# Patient Record
Sex: MALE | Race: WHITE | NOT HISPANIC OR LATINO | Employment: FULL TIME | URBAN - METROPOLITAN AREA
[De-identification: names, ages, dates, MRNs, and addresses within clinical notes are randomized per-mention and may not be internally consistent; named-entity substitution may affect disease eponyms.]

---

## 2017-06-20 ENCOUNTER — TRANSFERRED RECORDS (OUTPATIENT)
Dept: HEALTH INFORMATION MANAGEMENT | Facility: CLINIC | Age: 32
End: 2017-06-20

## 2017-09-12 ENCOUNTER — TRANSFERRED RECORDS (OUTPATIENT)
Dept: HEALTH INFORMATION MANAGEMENT | Facility: CLINIC | Age: 32
End: 2017-09-12

## 2018-09-20 NOTE — PROGRESS NOTES
SUBJECTIVE:   Germán Moreira is a 33 year old male who presents to clinic today for the following health issues:    Chief Complaint   Patient presents with     Headache       Headache  Onset: x 2 weeks    Description:   Location: Global   Character: throbbing pain, dull pain  Frequency:  intermitting  Duration:  often    Intensity: mild, moderate    Progression of Symptoms:  improving    Accompanying Signs & Symptoms:  Stiff neck: no  Neck or upper back pain: no  Fever: no  Sinus pressure: no  Nausea or vomiting: no  Dizziness: no  Numbness: no  Weakness: no  Visual changes: no  Fatigue: Yes    History:   Head trauma: no  Family history of migraines: no  Previous tests for headaches: no  Neurologist evaluations: no  Able to do daily activities: Yes  Wake with a headaches: no  Do headaches wake you up: no  Daily pain medication use: YES- Advil  Work/school stressors/changes: no    Precipitating factors:   Does light make it worse: no  Does sound make it worse: no    Alleviating factors:  Does sleep help: YES- Feels the best in the am    Therapies Tried and outcome: Ibuprofen (Advil, Motrin)- helps    Pt's past medical history, family history, habits, medications and allergies were reviewed with the patient today.  See snap shot for  HCM status. Most recent lab results reviewed with pt. Problem list and histories reviewed & adjusted, as indicated.  Additional history as below:    No headache starting 2 weeks ago as above.  Symptoms all over the head and not localized to the sinuses.  Patient denies nasal congestion or discharge.  Feels overall more tired with occasional chill.  Mild general achiness.  Appetite is reduced and patient has lost 5 pounds.  No known snoring.  Patient has had a few sweats at night to the point that his T shirt was wet.  When turning his head left or right, patient feels like his tracking is slowed but denies actual vertigo.  No coordination issues with walking.  Denies vision changes.   "Has had some stress but patient states this is no more than usual.  Patient has 2 young children but states that is going well.  Denies any recent travel.  No known tick bites.  Denies ear pain, sore throat, sinus pain, cough, shortness of breath, localized abdominal pain, nausea/vomiting, diarrhea, dysuria.  No rashes on his skin.  Denies erectile dysfunction.  Sex drive is seemed normal.  Patient continues to take generic Propecia to maintain her growth but has been on this for quite a long time.  Not taking other over-the-counter supplements     Additional ROS:   Constitutional, HEENT, Cardiovascular, Pulmonary, GI and , Neuro, MSK and Psych review of systems/symptoms are otherwise negative or unchanged from previous, except as noted above.      OBJECTIVE:  /80  Pulse 96  Temp 98.7  F (37.1  C) (Oral)  Resp 16  Ht 6' 1.25\" (1.861 m)  Wt 190 lb (86.2 kg)  SpO2 98%  BMI 24.9 kg/m2   Estimated body mass index is 24.9 kg/(m^2) as calculated from the following:    Height as of this encounter: 6' 1.25\" (1.861 m).    Weight as of this encounter: 190 lb (86.2 kg).  General appearance -   alert, no distress  Skin - No rashes or lesions.  Head - normocephalic, atraumatic  Eyes - MALISSA, EOMI, fundi exam with nondilated pupils negative.  Ears - External ears normal. Canals clear. TM's normal.  Nose/Sinuses - Nares normal. Septum midline. Mucosa minimally edematous.  No drainage or sinus tenderness.  Oropharynx - No erythema, no adenopathy, no exudates.  Neck - Supple without adenopathy or thyromegaly. No bruits.   No nucchal rigidity with full F/E  Lungs - Clear to auscultation without wheezes/rhonchi.  Heart - Regular rate and rhythm without murmurs, clicks, or gallops.  Nodes - No supraclavicular, axillary, or inguinal adenopathy palpable.  Abdomen - Abdomen soft, non-tender. BS normal. No masses or hepatosplenomegaly palpable. No bruits.  Extremities -No cyanosis, clubbing or edema.    Musculoskeletal - " Spine ROM normal. Muscular strength intact.   Peripheral pulses - radial=4/4, femoral=4/4, posterior tibial=4/4, dorsalis pedis=4/4,  Neuro - Gait normal. Reflexes normal and symmetric. Sensation grossly WNL. Normal FNF and HTT coordination F/B. Neg Hallpike bilaterally  Genital - Normal-appearing male external genitalia. No scrotal masses or inguinal hernia palpable.        Assessment/Plan: (See plan discussion below for further details)  1. Chronic nonintractable headache, unspecified headache type   Labs as above. If negative, will obtain MRI brain  - CRP inflammation  - Lyme Disease Elke with reflex to WB Serum  - Blood culture  - Anti Nuclear Elke IgG by IFA with Reflex    2. Night sweats   No obvious cause based on exam. No adenopathy.  Labs as ordered  - Comprehensive metabolic panel  - Testosterone Free and Total  - CRP inflammation  - Lactate Dehydrogenase  - Blood culture  - Anti Nuclear Elke IgG by IFA with Reflex    3. Loss of weight  Labs as ordered. Denies depression  - Comprehensive metabolic panel  - TSH with free T4 reflex  - CBC with platelets  - Testosterone Free and Total  - CRP inflammation  - Anti Nuclear Elke IgG by IFA with Reflex    4. Hepatitis   After above liver labs came back showing elevated LFTs additional labs ordered as below,   - Hepatitis C antibody; Future  - Hepatitis B Surface Antibody; Future  - Hepatitis B surface antigen; Future  - Iron and iron binding capacity; Future  - Erythrocyte sedimentation rate auto; Future  - Hepatitis A antibody IgM; Future  - Anaplasma phagocytoph antibody IgG IgM; Future  - Ehrlichia chaffeenis Abys IgG and IgM; Future      PLAN:  Labs as ordered above   Pt will hold Propecia and stop any ETOH use (currently rare use) and any OTC meds such as Tylenol    Berry Hancock MD  Internal Medicine Department  [facility]

## 2018-09-21 ENCOUNTER — OFFICE VISIT (OUTPATIENT)
Dept: INTERNAL MEDICINE | Facility: CLINIC | Age: 33
End: 2018-09-21
Payer: COMMERCIAL

## 2018-09-21 VITALS
TEMPERATURE: 98.7 F | SYSTOLIC BLOOD PRESSURE: 130 MMHG | WEIGHT: 190 LBS | HEIGHT: 73 IN | RESPIRATION RATE: 16 BRPM | HEART RATE: 96 BPM | OXYGEN SATURATION: 98 % | DIASTOLIC BLOOD PRESSURE: 80 MMHG | BODY MASS INDEX: 25.18 KG/M2

## 2018-09-21 DIAGNOSIS — K75.9 HEPATITIS: ICD-10-CM

## 2018-09-21 DIAGNOSIS — R51.9 CHRONIC NONINTRACTABLE HEADACHE, UNSPECIFIED HEADACHE TYPE: Primary | ICD-10-CM

## 2018-09-21 DIAGNOSIS — R63.4 LOSS OF WEIGHT: ICD-10-CM

## 2018-09-21 DIAGNOSIS — G89.29 CHRONIC NONINTRACTABLE HEADACHE, UNSPECIFIED HEADACHE TYPE: Primary | ICD-10-CM

## 2018-09-21 DIAGNOSIS — R61 NIGHT SWEATS: ICD-10-CM

## 2018-09-21 LAB
ALBUMIN SERPL-MCNC: 4.1 G/DL (ref 3.4–5)
ALP SERPL-CCNC: 151 U/L (ref 40–150)
ALT SERPL W P-5'-P-CCNC: 261 U/L (ref 0–70)
ANION GAP SERPL CALCULATED.3IONS-SCNC: 8 MMOL/L (ref 3–14)
AST SERPL W P-5'-P-CCNC: 166 U/L (ref 0–45)
BILIRUB SERPL-MCNC: 0.9 MG/DL (ref 0.2–1.3)
BUN SERPL-MCNC: 17 MG/DL (ref 7–30)
CALCIUM SERPL-MCNC: 8.7 MG/DL (ref 8.5–10.1)
CHLORIDE SERPL-SCNC: 99 MMOL/L (ref 94–109)
CO2 SERPL-SCNC: 28 MMOL/L (ref 20–32)
CREAT SERPL-MCNC: 1.08 MG/DL (ref 0.66–1.25)
CRP SERPL-MCNC: 12 MG/L (ref 0–8)
ERYTHROCYTE [DISTWIDTH] IN BLOOD BY AUTOMATED COUNT: 12.6 % (ref 10–15)
GFR SERPL CREATININE-BSD FRML MDRD: 78 ML/MIN/1.7M2
GLUCOSE SERPL-MCNC: 89 MG/DL (ref 70–99)
HCT VFR BLD AUTO: 42.9 % (ref 40–53)
HGB BLD-MCNC: 14.6 G/DL (ref 13.3–17.7)
LDH SERPL L TO P-CCNC: 601 U/L (ref 85–227)
MCH RBC QN AUTO: 30 PG (ref 26.5–33)
MCHC RBC AUTO-ENTMCNC: 34 G/DL (ref 31.5–36.5)
MCV RBC AUTO: 88 FL (ref 78–100)
PLATELET # BLD AUTO: 184 10E9/L (ref 150–450)
POTASSIUM SERPL-SCNC: 3.7 MMOL/L (ref 3.4–5.3)
PROT SERPL-MCNC: 8.4 G/DL (ref 6.8–8.8)
RBC # BLD AUTO: 4.86 10E12/L (ref 4.4–5.9)
SODIUM SERPL-SCNC: 135 MMOL/L (ref 133–144)
TSH SERPL DL<=0.005 MIU/L-ACNC: 1.66 MU/L (ref 0.4–4)
WBC # BLD AUTO: 12.4 10E9/L (ref 4–11)

## 2018-09-21 PROCEDURE — 84443 ASSAY THYROID STIM HORMONE: CPT | Performed by: INTERNAL MEDICINE

## 2018-09-21 PROCEDURE — 86038 ANTINUCLEAR ANTIBODIES: CPT | Performed by: INTERNAL MEDICINE

## 2018-09-21 PROCEDURE — 85004 AUTOMATED DIFF WBC COUNT: CPT | Performed by: INTERNAL MEDICINE

## 2018-09-21 PROCEDURE — 84403 ASSAY OF TOTAL TESTOSTERONE: CPT | Performed by: INTERNAL MEDICINE

## 2018-09-21 PROCEDURE — 80053 COMPREHEN METABOLIC PANEL: CPT | Performed by: INTERNAL MEDICINE

## 2018-09-21 PROCEDURE — 86618 LYME DISEASE ANTIBODY: CPT | Performed by: INTERNAL MEDICINE

## 2018-09-21 PROCEDURE — 87040 BLOOD CULTURE FOR BACTERIA: CPT | Performed by: INTERNAL MEDICINE

## 2018-09-21 PROCEDURE — 83615 LACTATE (LD) (LDH) ENZYME: CPT | Performed by: INTERNAL MEDICINE

## 2018-09-21 PROCEDURE — 85027 COMPLETE CBC AUTOMATED: CPT | Performed by: INTERNAL MEDICINE

## 2018-09-21 PROCEDURE — 36415 COLL VENOUS BLD VENIPUNCTURE: CPT | Performed by: INTERNAL MEDICINE

## 2018-09-21 PROCEDURE — 99214 OFFICE O/P EST MOD 30 MIN: CPT | Performed by: INTERNAL MEDICINE

## 2018-09-21 PROCEDURE — 84270 ASSAY OF SEX HORMONE GLOBUL: CPT | Performed by: INTERNAL MEDICINE

## 2018-09-21 PROCEDURE — 86140 C-REACTIVE PROTEIN: CPT | Performed by: INTERNAL MEDICINE

## 2018-09-21 RX ORDER — TRIAMCINOLONE ACETONIDE 1 MG/G
OINTMENT TOPICAL
COMMUNITY
Start: 2018-08-02 | End: 2022-08-12

## 2018-09-21 NOTE — MR AVS SNAPSHOT
"              After Visit Summary   9/21/2018    Germán Moreira    MRN: [id number]           Patient Information     Date Of Birth          1985        Visit Information        Provider Department      9/21/2018 3:30 PM Berry Hancock MD [facility]        Today's Diagnoses     Chronic nonintractable headache, unspecified headache type    -  1    Night sweats        Loss of weight        Hepatitis           Follow-ups after your visit        Who to contact     If you have questions or need follow up information about today's clinic visit or your schedule please contact [facility] directly at [phone].  Normal or non-critical lab and imaging results will be communicated to you by [employer], letter or phone within 4 business days after the clinic has received the results. If you do not hear from us within 7 days, please contact the clinic through [employer] or phone. If you have a critical or abnormal lab result, we will notify you by phone as soon as possible.  Submit refill requests through [employer] or call your pharmacy and they will forward the refill request to us. Please allow 3 business days for your refill to be completed.          Additional Information About Your Visit        MyChart Information     [employer] gives you secure access to your electronic health record. If you see a primary care provider, you can also send messages to your care team and make appointments. If you have questions, please call your primary care clinic.  If you do not have a primary care provider, please call [phone] and they will assist you.        Care EveryWhere ID     This is your Care EveryWhere ID. This could be used by other organizations to access your [place] medical records  [id number]        Your Vitals Were     Pulse Temperature Respirations Height Pulse Oximetry BMI (Body Mass Index)    96 98.7  F (37.1  C) (Oral) 16 6' 1.25\" (1.861 m) 98% 24.9 kg/m2       Blood " Pressure from Last 3 Encounters:   09/21/18 130/80   04/12/16 110/72   04/09/15 102/68    Weight from Last 3 Encounters:   09/21/18 190 lb (86.2 kg)   04/12/16 186 lb (84.4 kg)   04/09/15 168 lb 11.2 oz (76.5 kg)              We Performed the Following     Anti Nuclear Elke IgG by IFA with Reflex     Blood culture     CBC with platelets     Comprehensive metabolic panel     CRP inflammation     Lactate Dehydrogenase     Lyme Disease Elke with reflex to WB Serum     Testosterone Free and Total     TSH with free T4 reflex          Today's Medication Changes          These changes are accurate as of 9/21/18 11:59 PM.  If you have any questions, ask your nurse or doctor.               Stop taking these medicines if you haven't already. Please contact your care team if you have questions.     EPIPEN KRIS 1:1000  IJ   Stopped by:  Berry Hancock MD                    Primary Care Provider Office Phone # Fax #    Berry Hancock [phone]       [address] [place] [address]        Equal Access to Services     SAURAV [facility] Hospital[facility]LL : Hadii aad ku hadasho Soomaali, waaxda luqadaha, qaybta kaalmada adeegyada, waxkeith snow hayernie tay . So [facility] [phone].    ATENCIÓN: Si habla español, tiene a marcano disposición servicios gratuitos de asistencia lingüística. Llame al [phone].    We comply with applicable federal civil rights laws and [place] laws. We do not discriminate on the basis of race, color, national origin, age, disability, sex, sexual orientation, or gender identity.            Thank you!     Thank you for choosing [facility]  for your care. Our goal is always to provide you with excellent care. Hearing back from our patients is one way we can continue to improve our services. Please take a few minutes to complete the written survey that you may receive in the mail after your visit with us. Thank you!             Your Updated Medication List - Protect others  around you: Learn how to safely use, store and throw away your medicines at www.disposemymeds.org.          This list is accurate as of 9/21/18 11:59 PM.  Always use your most recent med list.                   Brand Name Dispense Instructions for use Diagnosis    EPINEPHrine 0.3 MG/0.3ML injection 2-pack    EPIPEN/ADRENACLICK/or ANY BX GENERIC EQUIV    1 each    Inject 0.3 mLs (0.3 mg) into the muscle once as needed for anaphylaxis    Allergy to seafood       PROPECIA 1 MG tablet   Generic drug:  finasteride          1 TABLET DAILY        triamcinolone 0.1 % ointment    KENALOG

## 2018-09-22 ENCOUNTER — MYC MEDICAL ADVICE (OUTPATIENT)
Dept: INTERNAL MEDICINE | Facility: CLINIC | Age: 33
End: 2018-09-22

## 2018-09-22 DIAGNOSIS — K75.9 HEPATITIS: Primary | ICD-10-CM

## 2018-09-22 DIAGNOSIS — K75.9 HEPATITIS: ICD-10-CM

## 2018-09-22 LAB
ERYTHROCYTE [SEDIMENTATION RATE] IN BLOOD BY WESTERGREN METHOD: 9 MM/H (ref 0–15)
IRON SATN MFR SERPL: 16 % (ref 15–46)
IRON SERPL-MCNC: 41 UG/DL (ref 35–180)
TIBC SERPL-MCNC: 255 UG/DL (ref 240–430)

## 2018-09-22 PROCEDURE — 86666 EHRLICHIA ANTIBODY: CPT | Mod: 90 | Performed by: INTERNAL MEDICINE

## 2018-09-22 PROCEDURE — 86706 HEP B SURFACE ANTIBODY: CPT | Performed by: INTERNAL MEDICINE

## 2018-09-22 PROCEDURE — 86803 HEPATITIS C AB TEST: CPT | Performed by: INTERNAL MEDICINE

## 2018-09-22 PROCEDURE — 99000 SPECIMEN HANDLING OFFICE-LAB: CPT | Performed by: INTERNAL MEDICINE

## 2018-09-22 PROCEDURE — 86709 HEPATITIS A IGM ANTIBODY: CPT | Performed by: INTERNAL MEDICINE

## 2018-09-22 PROCEDURE — 83550 IRON BINDING TEST: CPT | Performed by: INTERNAL MEDICINE

## 2018-09-22 PROCEDURE — 87340 HEPATITIS B SURFACE AG IA: CPT | Performed by: INTERNAL MEDICINE

## 2018-09-22 PROCEDURE — 83540 ASSAY OF IRON: CPT | Performed by: INTERNAL MEDICINE

## 2018-09-22 PROCEDURE — 36415 COLL VENOUS BLD VENIPUNCTURE: CPT | Performed by: INTERNAL MEDICINE

## 2018-09-22 PROCEDURE — 85652 RBC SED RATE AUTOMATED: CPT | Performed by: INTERNAL MEDICINE

## 2018-09-24 ENCOUNTER — TELEPHONE (OUTPATIENT)
Dept: INTERNAL MEDICINE | Facility: CLINIC | Age: 33
End: 2018-09-24

## 2018-09-24 LAB
A PHAGOCYTOPH IGG TITR SER IF: NORMAL {TITER}
A PHAGOCYTOPH IGM TITR SER IF: NORMAL {TITER}
ANA SER QL IF: NEGATIVE
B BURGDOR IGG+IGM SER QL: 0.43 (ref 0–0.89)
E CHAFFEENSIS IGG TITR SER: NORMAL {TITER}
E CHAFFEENSIS IGM TITR SER: NORMAL {TITER}

## 2018-09-24 NOTE — TELEPHONE ENCOUNTER
Reason for call:  Patient reporting a symptom    Symptom or request: Blood in stool/discoloration in stool    Duration (how long have symptoms been present): since Saturday    Have you been treated for this before? No    Additional comments: Per patient, he would like to speak with Dr. Chávez in regards to his symptoms.    Phone Number patient can be reached at:  Home number on file [phone] (home)    Best Time:      Can we leave a detailed message on this number:  YES    Call taken on 9/24/2018 at 8:20 AM by Kaley Ridley

## 2018-09-25 DIAGNOSIS — K75.9 HEPATITIS: ICD-10-CM

## 2018-09-25 LAB
HAV IGM SERPL QL IA: NONREACTIVE
HBV SURFACE AB SERPL IA-ACNC: 506.4 M[IU]/ML
HBV SURFACE AG SERPL QL IA: NONREACTIVE
HCV AB SERPL QL IA: NONREACTIVE
INR PPP: 1.05 (ref 0.86–1.14)
SHBG SERPL-SCNC: 63 NMOL/L (ref 11–80)
TESTOST FREE SERPL-MCNC: 4.36 NG/DL (ref 4.7–24.4)
TESTOST SERPL-MCNC: 342 NG/DL (ref 240–950)

## 2018-09-25 PROCEDURE — 36415 COLL VENOUS BLD VENIPUNCTURE: CPT | Performed by: INTERNAL MEDICINE

## 2018-09-25 PROCEDURE — 80053 COMPREHEN METABOLIC PANEL: CPT | Performed by: INTERNAL MEDICINE

## 2018-09-25 PROCEDURE — 85610 PROTHROMBIN TIME: CPT | Performed by: INTERNAL MEDICINE

## 2018-09-25 NOTE — TELEPHONE ENCOUNTER
Spoke with pt yesterday.  Trace of blood on outside of normal stool. Overall pt feeling a little better. Instructed pt to have repeat LFTs and INR nonfasting at any [facility] lab today or tomorrow. Orders are placed and pt has scheduled lab appt today per chart

## 2018-09-26 ENCOUNTER — TELEPHONE (OUTPATIENT)
Dept: INTERNAL MEDICINE | Facility: CLINIC | Age: 33
End: 2018-09-26

## 2018-09-26 DIAGNOSIS — K75.9 HEPATITIS: Primary | ICD-10-CM

## 2018-09-26 DIAGNOSIS — R63.4 LOSS OF WEIGHT: ICD-10-CM

## 2018-09-26 LAB
ALBUMIN SERPL-MCNC: 3.6 G/DL (ref 3.4–5)
ALP SERPL-CCNC: 125 U/L (ref 40–150)
ALT SERPL W P-5'-P-CCNC: 380 U/L (ref 0–70)
ANION GAP SERPL CALCULATED.3IONS-SCNC: 8 MMOL/L (ref 3–14)
AST SERPL W P-5'-P-CCNC: 248 U/L (ref 0–45)
BILIRUB SERPL-MCNC: 0.6 MG/DL (ref 0.2–1.3)
BUN SERPL-MCNC: 16 MG/DL (ref 7–30)
CALCIUM SERPL-MCNC: 8.3 MG/DL (ref 8.5–10.1)
CHLORIDE SERPL-SCNC: 100 MMOL/L (ref 94–109)
CO2 SERPL-SCNC: 27 MMOL/L (ref 20–32)
CREAT SERPL-MCNC: 1.06 MG/DL (ref 0.66–1.25)
GFR SERPL CREATININE-BSD FRML MDRD: 80 ML/MIN/1.7M2
GLUCOSE SERPL-MCNC: 90 MG/DL (ref 70–99)
POTASSIUM SERPL-SCNC: 4.4 MMOL/L (ref 3.4–5.3)
PROT SERPL-MCNC: 7.5 G/DL (ref 6.8–8.8)
SODIUM SERPL-SCNC: 135 MMOL/L (ref 133–144)

## 2018-09-26 NOTE — TELEPHONE ENCOUNTER
Spoke with pt. LFTs worse. NO obvious cause based on lab results ordered so far.  Pt states  feeling a little better and appetite improved some despite worsened  LFTs.  With worsening LFTs, will get imaging of abd with CT. [facility]  Further management (future lab recheck, GI consultation, etc) will be based on CT results

## 2018-09-27 ENCOUNTER — HOSPITAL ENCOUNTER (OUTPATIENT)
Dept: CT IMAGING | Facility: CLINIC | Age: 33
Discharge: HOME | End: 2018-09-27
Attending: INTERNAL MEDICINE | Admitting: INTERNAL MEDICINE
Payer: COMMERCIAL

## 2018-09-27 DIAGNOSIS — R63.4 LOSS OF WEIGHT: ICD-10-CM

## 2018-09-27 DIAGNOSIS — K75.9 HEPATITIS: ICD-10-CM

## 2018-09-27 LAB
BACTERIA SPEC CULT: NO GROWTH
DIFFERENTIAL METHOD BLD: ABNORMAL
EOSINOPHIL # BLD AUTO: 0.1 10E9/L (ref 0–0.7)
EOSINOPHIL NFR BLD AUTO: 1 %
LYMPHOCYTES # BLD AUTO: 7.1 10E9/L (ref 0.8–5.3)
LYMPHOCYTES NFR BLD AUTO: 57 %
Lab: NORMAL
MONOCYTES # BLD AUTO: 0.7 10E9/L (ref 0–1.3)
MONOCYTES NFR BLD AUTO: 6 %
NEUTROPHILS # BLD AUTO: 4.5 10E9/L (ref 1.6–8.3)
NEUTROPHILS NFR BLD AUTO: 36 %
PLATELET # BLD EST: ABNORMAL 10*3/UL
RBC MORPH BLD: NORMAL
SPECIMEN SOURCE: NORMAL

## 2018-09-27 PROCEDURE — 25000128 H RX IP 250 OP 636: Performed by: INTERNAL MEDICINE

## 2018-09-27 PROCEDURE — 25000125 ZZHC RX 250: Performed by: INTERNAL MEDICINE

## 2018-09-27 PROCEDURE — 74177 CT ABD & PELVIS W/CONTRAST: CPT

## 2018-09-27 RX ORDER — IOPAMIDOL 755 MG/ML
93 INJECTION, SOLUTION INTRAVASCULAR ONCE
Status: COMPLETED | OUTPATIENT
Start: 2018-09-27 | End: 2018-09-27

## 2018-09-27 RX ADMIN — IOPAMIDOL 93 ML: 755 INJECTION, SOLUTION INTRAVENOUS at 08:01

## 2018-09-27 RX ADMIN — SODIUM CHLORIDE, PRESERVATIVE FREE 67 ML: 5 INJECTION INTRAVENOUS at 08:00

## 2018-09-28 ENCOUNTER — EXTERNAL ORDER RESULTS (OUTPATIENT)
Dept: INTERNAL MEDICINE | Facility: CLINIC | Age: 33
End: 2018-09-28

## 2018-09-28 DIAGNOSIS — K75.9 HEPATITIS: Primary | ICD-10-CM

## 2018-10-01 ENCOUNTER — MYC MEDICAL ADVICE (OUTPATIENT)
Dept: INTERNAL MEDICINE | Facility: CLINIC | Age: 33
End: 2018-10-01

## 2018-10-01 DIAGNOSIS — K75.9 HEPATITIS: ICD-10-CM

## 2018-10-01 LAB
ALBUMIN SERPL-MCNC: 3.6 G/DL (ref 3.4–5)
ALP SERPL-CCNC: 99 U/L (ref 40–150)
ALT SERPL W P-5'-P-CCNC: 312 U/L (ref 0–70)
AST SERPL W P-5'-P-CCNC: 136 U/L (ref 0–45)
BILIRUB DIRECT SERPL-MCNC: 0.2 MG/DL (ref 0–0.2)
BILIRUB SERPL-MCNC: 0.7 MG/DL (ref 0.2–1.3)
PROT SERPL-MCNC: 7.7 G/DL (ref 6.8–8.8)

## 2018-10-01 PROCEDURE — 80076 HEPATIC FUNCTION PANEL: CPT | Performed by: INTERNAL MEDICINE

## 2018-10-01 PROCEDURE — 36415 COLL VENOUS BLD VENIPUNCTURE: CPT | Performed by: INTERNAL MEDICINE

## 2018-10-02 DIAGNOSIS — K75.9 HEPATITIS: Primary | ICD-10-CM

## 2018-10-02 NOTE — TELEPHONE ENCOUNTER
Spoke with pt. LFTs improving and pt feeling OK except for  Mild URI sx  As below that are also better this afternoon and will treated with warm water, saline nasal rinse, etc. Will continue off of meds and any ETOH ands repeat lab 2 weeks

## 2018-10-16 DIAGNOSIS — K75.9 HEPATITIS: ICD-10-CM

## 2018-10-16 LAB
ALBUMIN SERPL-MCNC: 3.6 G/DL (ref 3.4–5)
ALP SERPL-CCNC: 69 U/L (ref 40–150)
ALT SERPL W P-5'-P-CCNC: 60 U/L (ref 0–70)
AST SERPL W P-5'-P-CCNC: 26 U/L (ref 0–45)
BILIRUB DIRECT SERPL-MCNC: 0.2 MG/DL (ref 0–0.2)
BILIRUB SERPL-MCNC: 0.6 MG/DL (ref 0.2–1.3)
PROT SERPL-MCNC: 7.4 G/DL (ref 6.8–8.8)

## 2018-10-16 PROCEDURE — 36415 COLL VENOUS BLD VENIPUNCTURE: CPT | Performed by: INTERNAL MEDICINE

## 2018-10-16 PROCEDURE — 80076 HEPATIC FUNCTION PANEL: CPT | Performed by: INTERNAL MEDICINE

## 2018-10-17 DIAGNOSIS — K75.9 HEPATITIS: Primary | ICD-10-CM

## 2018-10-31 ENCOUNTER — MYC MEDICAL ADVICE (OUTPATIENT)
Dept: INTERNAL MEDICINE | Facility: CLINIC | Age: 33
End: 2018-10-31

## 2018-11-12 ENCOUNTER — TELEPHONE (OUTPATIENT)
Dept: FAMILY MEDICINE | Facility: CLINIC | Age: 33
End: 2018-11-12

## 2018-11-12 ENCOUNTER — OFFICE VISIT (OUTPATIENT)
Dept: INTERNAL MEDICINE | Facility: CLINIC | Age: 33
End: 2018-11-12
Payer: COMMERCIAL

## 2018-11-12 VITALS
BODY MASS INDEX: 23.85 KG/M2 | OXYGEN SATURATION: 99 % | DIASTOLIC BLOOD PRESSURE: 78 MMHG | TEMPERATURE: 98.3 F | WEIGHT: 182 LBS | RESPIRATION RATE: 16 BRPM | SYSTOLIC BLOOD PRESSURE: 130 MMHG | HEART RATE: 82 BPM

## 2018-11-12 DIAGNOSIS — R07.9 ACUTE CHEST PAIN: Primary | ICD-10-CM

## 2018-11-12 LAB
ALBUMIN SERPL-MCNC: 4.2 G/DL (ref 3.4–5)
ALP SERPL-CCNC: 65 U/L (ref 40–150)
ALT SERPL W P-5'-P-CCNC: 46 U/L (ref 0–70)
ANION GAP SERPL CALCULATED.3IONS-SCNC: 7 MMOL/L (ref 3–14)
AST SERPL W P-5'-P-CCNC: 24 U/L (ref 0–45)
BILIRUB SERPL-MCNC: 0.9 MG/DL (ref 0.2–1.3)
BUN SERPL-MCNC: 14 MG/DL (ref 7–30)
CALCIUM SERPL-MCNC: 8.8 MG/DL (ref 8.5–10.1)
CHLORIDE SERPL-SCNC: 104 MMOL/L (ref 94–109)
CO2 SERPL-SCNC: 28 MMOL/L (ref 20–32)
CREAT SERPL-MCNC: 0.9 MG/DL (ref 0.66–1.25)
D DIMER PPP FEU-MCNC: 0.2 UG/ML FEU (ref 0–0.5)
ERYTHROCYTE [SEDIMENTATION RATE] IN BLOOD BY WESTERGREN METHOD: 6 MM/H (ref 0–15)
GFR SERPL CREATININE-BSD FRML MDRD: >90 ML/MIN/1.7M2
GLUCOSE SERPL-MCNC: 86 MG/DL (ref 70–99)
POTASSIUM SERPL-SCNC: 3.7 MMOL/L (ref 3.4–5.3)
PROT SERPL-MCNC: 8 G/DL (ref 6.8–8.8)
SODIUM SERPL-SCNC: 139 MMOL/L (ref 133–144)
TROPONIN I SERPL-MCNC: <0.015 UG/L (ref 0–0.04)

## 2018-11-12 PROCEDURE — 85652 RBC SED RATE AUTOMATED: CPT | Performed by: INTERNAL MEDICINE

## 2018-11-12 PROCEDURE — 84484 ASSAY OF TROPONIN QUANT: CPT | Performed by: INTERNAL MEDICINE

## 2018-11-12 PROCEDURE — 85379 FIBRIN DEGRADATION QUANT: CPT | Performed by: INTERNAL MEDICINE

## 2018-11-12 PROCEDURE — 93000 ELECTROCARDIOGRAM COMPLETE: CPT | Performed by: INTERNAL MEDICINE

## 2018-11-12 PROCEDURE — 80053 COMPREHEN METABOLIC PANEL: CPT | Performed by: INTERNAL MEDICINE

## 2018-11-12 PROCEDURE — 99214 OFFICE O/P EST MOD 30 MIN: CPT | Performed by: INTERNAL MEDICINE

## 2018-11-12 PROCEDURE — 36415 COLL VENOUS BLD VENIPUNCTURE: CPT | Performed by: INTERNAL MEDICINE

## 2018-11-12 ASSESSMENT — PATIENT HEALTH QUESTIONNAIRE - PHQ9
5. POOR APPETITE OR OVEREATING: NOT AT ALL
SUM OF ALL RESPONSES TO PHQ QUESTIONS 1-9: 0

## 2018-11-12 ASSESSMENT — ANXIETY QUESTIONNAIRES
2. NOT BEING ABLE TO STOP OR CONTROL WORRYING: NOT AT ALL
5. BEING SO RESTLESS THAT IT IS HARD TO SIT STILL: NOT AT ALL
IF YOU CHECKED OFF ANY PROBLEMS ON THIS QUESTIONNAIRE, HOW DIFFICULT HAVE THESE PROBLEMS MADE IT FOR YOU TO DO YOUR WORK, TAKE CARE OF THINGS AT HOME, OR GET ALONG WITH OTHER PEOPLE: NOT DIFFICULT AT ALL
3. WORRYING TOO MUCH ABOUT DIFFERENT THINGS: NOT AT ALL
7. FEELING AFRAID AS IF SOMETHING AWFUL MIGHT HAPPEN: NOT AT ALL
6. BECOMING EASILY ANNOYED OR IRRITABLE: NOT AT ALL
1. FEELING NERVOUS, ANXIOUS, OR ON EDGE: NOT AT ALL
GAD7 TOTAL SCORE: 0

## 2018-11-12 NOTE — PROGRESS NOTES
"  SUBJECTIVE:   Germán Moreira is a 33 year old male who presents to clinic today for the following health issues:    Chief Complaint   Patient presents with     Chest Pain       CHEST PAIN     Onset: 1 week    Description:   Location:  left side  Character: Dull achey  Radiation: left arm and right arm occ  Duration: intermittent     Intensity: mild    Progression of Symptoms:  same    Accompanying Signs & Symptoms:  Shortness of breath: no  Sweating: no  Nausea/vomiting: no  Lightheadedness: YES- \"cloudy\"  Palpitations: no  Fever/Chills: no  Cough: no  Heartburn: no    History:   Family history of heart disease no  Tobacco use: no    Precipitating factors:   Worse with exertion: no  Worse with deep breaths :  YES- at times  Related to food: no    Alleviating factors:  none       Therapies Tried and outcome: none        Pt's past medical history, family history, habits, medications and allergies were reviewed with the patient today.  See snap shot for  HCM status. Most recent lab results reviewed with pt. Problem list and histories reviewed & adjusted, as indicated.  Additional history as below:    Patient notes some recent upper respiratory infectious symptoms including mild sore throat, voice hoarseness and nonproductive cough.  He is overall getting better.  Denies sinus drainage.  No shortness of breath.  No fevers or chills.  No recent travel.  Denies calf pain.  Chest pains sometimes on the left side and sometimes on the right and right also has noticed some achiness intermittently in the arms..  Denies neck pain.  No weakness in the extremities.  Able to reproduce discomfort in chest slightly with palpation near the sternum and pectoral musculature bilaterally     Additional ROS:   Constitutional, HEENT, Cardiovascular, Pulmonary, GI and , Neuro, MSK and Psych review of systems/symptoms are otherwise negative or unchanged from previous, except as noted above.      OBJECTIVE:  /78  Pulse 82  Temp " "98.3  F (36.8  C) (Oral)  Resp 16  Wt 182 lb (82.6 kg)  SpO2 99%  BMI 23.85 kg/m2   Estimated body mass index is 23.85 kg/(m^2) as calculated from the following:    Height as of 9/21/18: 6' 1.25\" (1.861 m).    Weight as of this encounter: 182 lb (82.6 kg).    HENT: ear canals and TM's normal and nose and mouth without ulcers or lesions.  Nasal mucosa mildly edematous.  Sinuses nontender to palpation  Neck: no adenopathy. Thyroid normal to palpation. No bruits  Pulm: Lungs clear to auscultation   CV: Regular rates and rhythm  GI: Soft, nontender, Normal active bowel sounds, No hepatosplenomegaly or masses palpable  Ext: Peripheral pulses intact. No edema.  Neuro: Normal strength and tone, sensory exam grossly normal  MSK: No tenderness to neck range of motion.  Mild tenderness palpation. peristernal cartilage and bilateral pectoral musculature    EKG: No acute ST/T changes. Rate 90    Assessment/Plan: (See plan discussion below for further details)  1. Acute chest pain  Atypical and appears likely related to recent URI and some costochondritis. Will check labs in addition to r/o more serious causes. If neg, may use Occ IBF with food as needed  - Erythrocyte sedimentation rate auto  - Comprehensive metabolic panel  - D dimer, quantitative  - Troponin I  - EKG           Berry Hancock MD  Internal Medicine Department  [facility]        "

## 2018-11-12 NOTE — TELEPHONE ENCOUNTER
Germán Moreira is a 33 year old male who calls with chest pain.     PRESENTING PROBLEM:  Chest pain sometimes in center, sometimes in L arm, sometimes in R arm (not radiating), some in head times.  3-4 days of cold symptoms.    NURSING ASSESSMENT:  Patient complains of chest pain and chest pressure/discomfort.  Onset:  3-4 days ago, pain intermittent and changes locations.  Pain is characterized as discomfort more than pain.   Severity intermittent  Located left arm, left chest, right arm, right chest and right shoulder   Radiates to none.  Duration intermittent.  Associated symptoms cough.  Exacerbated by no known provoking events.  Relieved by none.  No pain while sleeping.  Cardiac risk factors: denies all..  Associated Medical History: wondering if the inflamed liver is causing this.  Allergies:   Allergies   Allergen Reactions     Seafood      anaphylaxis     Patient states the pain shows up in different areas, and feels like muscle weakness.  He describes the pain as discomfort.  It is worse with a deep breath.  Denies, naurse vomiting, trouble breathing, or discoloration in skin.  Denies history personal or family or heart issues, no diab, no travel or prolonged sitting.  No coughing up blood.  No drug use.   History of sports injury in right shoulder.        Last exam/Treatment:  9/21/18    NURSING PLAN: Routed to provider FYI-  Appt scheduled for 4.    RECOMMENDED DISPOSITION:  See in 24 hours - Patient has an appt today.  Will call 911/go to ED if trouble breathing, pain radiating, palpitations.  Will comply with recommendation: Yes  If further questions/concerns or if symptoms do not improve, worsen or new symptoms develop, call your PCP or [facility] as soon as possible.      Guideline used:  Telephone Triage Protocols for Nurses, Fourth Edition, Anna Cameron RN

## 2018-11-13 ASSESSMENT — ANXIETY QUESTIONNAIRES: GAD7 TOTAL SCORE: 0

## 2019-01-18 ENCOUNTER — OFFICE VISIT (OUTPATIENT)
Dept: INTERNAL MEDICINE | Facility: CLINIC | Age: 34
End: 2019-01-18
Payer: COMMERCIAL

## 2019-01-18 VITALS
OXYGEN SATURATION: 99 % | HEART RATE: 66 BPM | BODY MASS INDEX: 24.39 KG/M2 | WEIGHT: 184 LBS | SYSTOLIC BLOOD PRESSURE: 128 MMHG | HEIGHT: 73 IN | TEMPERATURE: 97.5 F | RESPIRATION RATE: 16 BRPM | DIASTOLIC BLOOD PRESSURE: 78 MMHG

## 2019-01-18 DIAGNOSIS — Z00.00 ENCOUNTER FOR ROUTINE ADULT HEALTH EXAMINATION WITHOUT ABNORMAL FINDINGS: Primary | ICD-10-CM

## 2019-01-18 LAB
ALBUMIN SERPL-MCNC: 4.4 G/DL (ref 3.4–5)
ALP SERPL-CCNC: 63 U/L (ref 40–150)
ALT SERPL W P-5'-P-CCNC: 32 U/L (ref 0–70)
ANION GAP SERPL CALCULATED.3IONS-SCNC: 6 MMOL/L (ref 3–14)
AST SERPL W P-5'-P-CCNC: 11 U/L (ref 0–45)
BILIRUB SERPL-MCNC: 1.3 MG/DL (ref 0.2–1.3)
BUN SERPL-MCNC: 15 MG/DL (ref 7–30)
CALCIUM SERPL-MCNC: 9 MG/DL (ref 8.5–10.1)
CHLORIDE SERPL-SCNC: 102 MMOL/L (ref 94–109)
CHOLEST SERPL-MCNC: 158 MG/DL
CO2 SERPL-SCNC: 29 MMOL/L (ref 20–32)
CREAT SERPL-MCNC: 0.9 MG/DL (ref 0.66–1.25)
ERYTHROCYTE [DISTWIDTH] IN BLOOD BY AUTOMATED COUNT: 13.5 % (ref 10–15)
GFR SERPL CREATININE-BSD FRML MDRD: >90 ML/MIN/{1.73_M2}
GLUCOSE SERPL-MCNC: 93 MG/DL (ref 70–99)
HCT VFR BLD AUTO: 44.1 % (ref 40–53)
HDLC SERPL-MCNC: 57 MG/DL
HGB BLD-MCNC: 15.5 G/DL (ref 13.3–17.7)
LDLC SERPL CALC-MCNC: 91 MG/DL
MCH RBC QN AUTO: 30.2 PG (ref 26.5–33)
MCHC RBC AUTO-ENTMCNC: 35.1 G/DL (ref 31.5–36.5)
MCV RBC AUTO: 86 FL (ref 78–100)
NONHDLC SERPL-MCNC: 101 MG/DL
PLATELET # BLD AUTO: 202 10E9/L (ref 150–450)
POTASSIUM SERPL-SCNC: 3.6 MMOL/L (ref 3.4–5.3)
PROT SERPL-MCNC: 8 G/DL (ref 6.8–8.8)
RBC # BLD AUTO: 5.14 10E12/L (ref 4.4–5.9)
SODIUM SERPL-SCNC: 137 MMOL/L (ref 133–144)
TRIGL SERPL-MCNC: 52 MG/DL
VIT B12 SERPL-MCNC: 433 PG/ML (ref 193–986)
WBC # BLD AUTO: 7.9 10E9/L (ref 4–11)

## 2019-01-18 PROCEDURE — 80061 LIPID PANEL: CPT | Performed by: INTERNAL MEDICINE

## 2019-01-18 PROCEDURE — 82607 VITAMIN B-12: CPT | Performed by: INTERNAL MEDICINE

## 2019-01-18 PROCEDURE — 99395 PREV VISIT EST AGE 18-39: CPT | Performed by: INTERNAL MEDICINE

## 2019-01-18 PROCEDURE — 80053 COMPREHEN METABOLIC PANEL: CPT | Performed by: INTERNAL MEDICINE

## 2019-01-18 PROCEDURE — 36415 COLL VENOUS BLD VENIPUNCTURE: CPT | Performed by: INTERNAL MEDICINE

## 2019-01-18 PROCEDURE — 85027 COMPLETE CBC AUTOMATED: CPT | Performed by: INTERNAL MEDICINE

## 2019-01-18 ASSESSMENT — MIFFLIN-ST. JEOR: SCORE: 1837.46

## 2019-01-18 NOTE — PROGRESS NOTES
SUBJECTIVE:   CC: Germán Moreira is an 33 year old male who presents for preventive health visit.     Healthy Habits:  Answers for HPI/ROS submitted by the patient on 1/16/2019   Annual Exam:  Frequency of exercise:: None  Getting at least 3 servings of Calcium per day:: Yes  Diet:: Regular (no restrictions)  Taking medications regularly:: Yes  Medication side effects:: Not applicable  Bi-annual eye exam:: NO  Dental care twice a year:: Yes  Sleep apnea or symptoms of sleep apnea:: None  Additional concerns today:: No          Today's PHQ-2 Score:   PHQ-2 ( 1999 [employer]) 1/16/2019 11/12/2018   Q1: Little interest or pleasure in doing things 0 0   Q2: Feeling down, depressed or hopeless 0 0   PHQ-2 Score 0 0   Q1: Little interest or pleasure in doing things Not at all -   Q2: Feeling down, depressed or hopeless Not at all -   PHQ-2 Score 0 -       Abuse: Current or Past(Physical, Sexual or Emotional)- No  Do you feel safe in your environment? Yes    Social History     Tobacco Use     Smoking status: Never Smoker     Smokeless tobacco: Never Used   Substance Use Topics     Alcohol use: Yes     Comment: casu     If you drink alcohol do you typically have >3 drinks per day or >7 drinks per week? No                      Last PSA: No results found for: PSA    Reviewed orders with patient. Reviewed health maintenance and updated orders accordingly - Yes  Labs reviewed in EPIC    Reviewed and updated as needed this visit by clinical staff         Reviewed and updated as needed this visit by Provider            ROS:  CONSTITUTIONAL: NEGATIVE for fever, chills. Weight down a few pounds   INTEGUMENTARY/SKIN: NEGATIVE for worrisome rashes, moles or lesions  EYES: NEGATIVE for vision changes or irritation.  Due for eye exam   ENT: NEGATIVE for ear, mouth and throat problems  RESP: NEGATIVE for significant cough or SOB  CV: NEGATIVE for chest pain, palpitations or peripheral edema  GI: NEGATIVE for nausea, abdominal pain,  "heartburn, or change in bowel habits   male: negative for dysuria, hematuria, decreased urinary stream, erectile dysfunction, urethral discharge  MUSCULOSKELETAL: NEGATIVE for significant arthralgias or myalgia  NEURO: NEGATIVE for weakness, dizziness. Very rare sense of parasthesia in left hand. No sx now  PSYCHIATRIC: NEGATIVE for changes in mood or affect    OBJECTIVE:   /78   Pulse 66   Temp 97.5  F (36.4  C) (Oral)   Resp 16   Ht 1.861 m (6' 1.25\")   Wt 83.5 kg (184 lb)   SpO2 99%   BMI 24.11 kg/m    EXAM:  General appearance - healthy, alert, no distress  Skin - No rashes or lesions. Few small skin tags on trunk  Head - normocephalic, atraumatic  Eyes - MALISSA, EOMI, fundi exam with nondilated pupils negative.  Ears - External ears normal. Canals clear. TM's normal.  Nose/Sinuses - Nares normal. Septum midline. Mucosa normal. No drainage or sinus tenderness.  Oropharynx - No erythema, no adenopathy, no exudates.  Neck - Supple without adenopathy or thyromegaly. No bruits.  Lungs - Clear to auscultation without wheezes/rhonchi.  Heart - Regular rate and rhythm without murmurs, clicks, or gallops.  Nodes - No supraclavicular, axillary, or inguinal adenopathy palpable.  Abdomen - Abdomen soft, non-tender. BS normal. No masses or hepatosplenomegaly palpable. No bruits.  Extremities -No cyanosis, clubbing or edema.    Musculoskeletal - Spine ROM normal. Muscular strength intact.   Peripheral pulses - radial=4/4, femoral=4/4, posterior tibial=4/4, dorsalis pedis=4/4,  Neuro - Gait normal. Reflexes normal and symmetric. Sensation grossly WNL to light touch sensation and vibration testing in distal bilateral upper and lower extremities.  Neg Tinel's bilaterally   Genital - Normal-appearing male external genitalia. No scrotal masses or inguinal hernia palpable.   Rectal - Guaic negative stool. Normal tone. Prostate normal in size to palpation. No rectal masses or prostate nodularity palpable       " "    ASSESSMENT/PLAN:   1. Encounter for routine adult health examination without abnormal findings   Screening labs as ordered. Recommended eye exam. Pt declines flu vaccine  - Comprehensive metabolic panel  - Lipid panel reflex to direct LDL Fasting  - CBC with platelets  - Vitamin B12    COUNSELING:  Reviewed preventive health counseling, as reflected in patient instructions    BP Readings from Last 1 Encounters:   11/12/18 130/78     Estimated body mass index is 23.85 kg/m  as calculated from the following:    Height as of 9/21/18: 1.861 m (6' 1.25\").    Weight as of 11/12/18: 82.6 kg (182 lb).    BP Screening:   Last 3 BP Readings:    BP Readings from Last 3 Encounters:   01/18/19 128/78   11/12/18 130/78   09/21/18 130/80       The following was recommended to the patient:  Re-screen BP within a year and recommended lifestyle modifications       reports that  has never smoked. he has never used smokeless tobacco.      Counseling Resources:  ATP IV Guidelines  Pooled Cohorts Equation Calculator  FRAX Risk Assessment  ICSI Preventive Guidelines  Dietary Guidelines for Americans, 2010  USDA's MyPlate  ASA Prophylaxis  Lung CA Screening    Berry Hancock MD  [facility]  "

## 2019-01-22 ENCOUNTER — TELEPHONE (OUTPATIENT)
Dept: INTERNAL MEDICINE | Facility: CLINIC | Age: 34
End: 2019-01-22

## 2019-01-22 NOTE — LETTER
[facility]  [address] [place] [address]  [phone]      1/22/2019       Germán Moreira  [address] [place] [id number]        Dear Germán,  Enclosed is the original copy of your Biometric Form. A copy has been faxed to [phone] and scanned into your chart. Call the number listed above with any questions.      Sincerely,      Berry Hancock MD/[facility]    Internal Medicine

## 2019-01-22 NOTE — TELEPHONE ENCOUNTER
Biometric Form Faxed: [phone], copy scanned into chart and original mailed to patient.  The patient has been notified.

## 2019-05-14 ENCOUNTER — OFFICE VISIT (OUTPATIENT)
Dept: INTERNAL MEDICINE | Facility: CLINIC | Age: 34
End: 2019-05-14
Payer: COMMERCIAL

## 2019-05-14 VITALS
DIASTOLIC BLOOD PRESSURE: 78 MMHG | RESPIRATION RATE: 16 BRPM | OXYGEN SATURATION: 100 % | TEMPERATURE: 97.8 F | SYSTOLIC BLOOD PRESSURE: 124 MMHG | WEIGHT: 177 LBS | BODY MASS INDEX: 23.19 KG/M2 | HEART RATE: 78 BPM

## 2019-05-14 DIAGNOSIS — M54.2 NECK DISCOMFORT: ICD-10-CM

## 2019-05-14 DIAGNOSIS — F41.9 ANXIETY: Primary | ICD-10-CM

## 2019-05-14 PROCEDURE — 99213 OFFICE O/P EST LOW 20 MIN: CPT | Performed by: INTERNAL MEDICINE

## 2019-05-14 NOTE — PROGRESS NOTES
"  SUBJECTIVE:   Germán Moreira is a 34 year old male who presents to clinic today for the following   health issues:    Chief Complaint   Patient presents with     Gland     Patient c/o lump on right side on throat, x5 weeks     Pt's past medical history, family history, habits, medications and allergies were reviewed with the patient today.  See snap shot for  HCM status. Most recent lab results reviewed with pt. Problem list and histories reviewed & adjusted, as indicated.  Additional history as below:     Had fever and diarrhea 5 weeks ago for 3 days and then resolved  Then had some soreness neck with head turning for 4-5 days and then that resolved without intervention   Since then., minimal fogginess in head   Occ odd achy feeling left shoulder or different areas of the neck  CUrrently no ear pain, sore throat, cough, shortness of breath. No dysphagia  No UE or LE  Neuro sx.   Feels slight prominence in left neck under chin and worried might have cancer  In general, has been more anxious about things since becoming a father. Work and marriage good per pt   FRANCE = 9     Additional ROS:   Constitutional, HEENT, Cardiovascular, Pulmonary, GI and , Neuro, MSK and Psych review of systems/symptoms are otherwise negative or unchanged from previous, except as noted above.      OBJECTIVE:  /78   Pulse 78   Temp 97.8  F (36.6  C) (Oral)   Resp 16   Wt 80.3 kg (177 lb)   SpO2 100%   BMI 23.19 kg/m     Estimated body mass index is 23.19 kg/m  as calculated from the following:    Height as of 1/18/19: 1.861 m (6' 1.25\").    Weight as of this encounter: 80.3 kg (177 lb).     HENT: ear canals and TM's normal and nose and mouth without ulcers or lesions   Neck:  Thyroid normal to palpation. No bruits. Area where pt feels \"swelling\" seems to be edge of hyoid bone vs part of the salivary gland. I do not appreciate any anterior/posterior chain adenopathy on palpation. FROM without tenderness. NO nucchal " "rigidity  Pulm: Lungs clear to auscultation   CV: Regular rates and rhythm  GI: Soft, nontender, Normal active bowel sounds, No hepatosplenomegaly or masses palpable  Ext: Peripheral pulses intact. No edema.  Neuro: Normal strength and tone, sensory exam grossly normal  Node: As above re: neck.  No supraclavicular, axillary, epitrochlear or inguinal adenopathy palpable.  Gen: Slight anxious affect    Assessment/Plan: (See plan discussion below for further details)  1. Neck discomfort  No actual adenopathy appreciated on my exam today.  Patient likely had mild adenopathy with previous infectious issues that have resolved and now increasingly anxious about this.  Counseled patient to watch things over the next couple weeks.  If still feeling this the first week of June, then patient will inform physician and to be extra cautious, will obtain CT scan soft tissue of the neck    2. Anxiety  Patient admits in general to having increasing anxiety issues.  FRANCE = 9.  Will refer for counseling.  Discussed CBT.  If not controlled with these options, patient will inform physician and will then consider SSRI therapy  - MENTAL HEALTH REFERRAL  - Adult; Outpatient Treatment; Individual/Couples/Family/Group Therapy/Health Psychology; [facility] [place]: [facility] [phone]; We will contact you to schedule the appointment or please call with any questions    Plan discussion:  Referral to [facility]. They will khris to schedule or you may call them  If neck issues persist 6/3/19, then email me and will get CT scan of neck  If mood issues worsen in future, then possible Escitalopram/lexapro  \"Mind over Mood\" book option for CBT     >25 minutes was spent with the patient today with more than 50% of the appointment providing counseling/education re: anxiety and treatment options  Along with review of neck anatomy showing pt pictures from anatomy text  and coordination of care      Berry Hancock MD  Internal Medicine " [facility]    (Chart documentation was completed, in part, with [employer] voice-recognition software. Even though reviewed, some grammatical, spelling, and word errors may remain.)

## 2019-05-14 NOTE — PATIENT INSTRUCTIONS
"Referral to [facility]. They will khris to schedule or you may call them  If neck issues persist 6/3/19, then email me and pavell get CT scan of neck  If mood issues worsen in future, then possible Escitalopram/lexapro  \"Mind over Mood\" book option for CBT  "

## 2019-05-16 ASSESSMENT — ANXIETY QUESTIONNAIRES
6. BECOMING EASILY ANNOYED OR IRRITABLE: SEVERAL DAYS
IF YOU CHECKED OFF ANY PROBLEMS ON THIS QUESTIONNAIRE, HOW DIFFICULT HAVE THESE PROBLEMS MADE IT FOR YOU TO DO YOUR WORK, TAKE CARE OF THINGS AT HOME, OR GET ALONG WITH OTHER PEOPLE: SOMEWHAT DIFFICULT
5. BEING SO RESTLESS THAT IT IS HARD TO SIT STILL: NOT AT ALL
3. WORRYING TOO MUCH ABOUT DIFFERENT THINGS: SEVERAL DAYS
7. FEELING AFRAID AS IF SOMETHING AWFUL MIGHT HAPPEN: MORE THAN HALF THE DAYS
GAD7 TOTAL SCORE: 9
2. NOT BEING ABLE TO STOP OR CONTROL WORRYING: SEVERAL DAYS
1. FEELING NERVOUS, ANXIOUS, OR ON EDGE: MORE THAN HALF THE DAYS

## 2019-05-16 ASSESSMENT — PATIENT HEALTH QUESTIONNAIRE - PHQ9: 5. POOR APPETITE OR OVEREATING: MORE THAN HALF THE DAYS

## 2019-05-17 ASSESSMENT — ANXIETY QUESTIONNAIRES: GAD7 TOTAL SCORE: 9

## 2019-05-20 ENCOUNTER — MYC MEDICAL ADVICE (OUTPATIENT)
Dept: INTERNAL MEDICINE | Facility: CLINIC | Age: 34
End: 2019-05-20

## 2019-05-20 DIAGNOSIS — R22.1 LOCALIZED SWELLING, MASS OR LUMP OF NECK: Primary | ICD-10-CM

## 2019-05-20 NOTE — TELEPHONE ENCOUNTER
"PCP please see [employer] message regarding right sided throat/neck soreness update.    JEFFERY, Per 5/14/2019 LOV note:    Return in about 3 weeks (around 6/4/2019) for recheck if acute symptoms are not improving.   Referral to [facility]. They will khris to schedule or you may call them  If neck issues persist 6/3/19, then email me and wilkl get CT scan of neck  If mood issues worsen in future, then possible Escitalopram/lexapro  \"Mind over Mood\" book option for CBT              Please advise if you would like to see patient in for a follow up or ok with ordering a CT.    Radha CARRERO, RN, BSN, PHN      "

## 2019-05-22 ENCOUNTER — HOSPITAL ENCOUNTER (OUTPATIENT)
Dept: CT IMAGING | Facility: CLINIC | Age: 34
Discharge: HOME | End: 2019-05-22
Attending: INTERNAL MEDICINE | Admitting: INTERNAL MEDICINE
Payer: COMMERCIAL

## 2019-05-22 DIAGNOSIS — R22.1 LOCALIZED SWELLING, MASS OR LUMP OF NECK: ICD-10-CM

## 2019-05-22 PROCEDURE — 25000125 ZZHC RX 250: Performed by: INTERNAL MEDICINE

## 2019-05-22 PROCEDURE — 25000128 H RX IP 250 OP 636: Performed by: INTERNAL MEDICINE

## 2019-05-22 PROCEDURE — 70491 CT SOFT TISSUE NECK W/DYE: CPT

## 2019-05-22 RX ORDER — IOPAMIDOL 755 MG/ML
80 INJECTION, SOLUTION INTRAVASCULAR ONCE
Status: COMPLETED | OUTPATIENT
Start: 2019-05-22 | End: 2019-05-22

## 2019-05-22 RX ADMIN — SODIUM CHLORIDE 60 ML: 9 INJECTION, SOLUTION INTRAVENOUS at 16:37

## 2019-05-22 RX ADMIN — IOPAMIDOL 80 ML: 755 INJECTION, SOLUTION INTRAVENOUS at 16:37

## 2019-05-24 ENCOUNTER — TELEPHONE (OUTPATIENT)
Dept: INTERNAL MEDICINE | Facility: CLINIC | Age: 34
End: 2019-05-24

## 2019-05-24 DIAGNOSIS — R59.1 LA (LYMPHADENOPATHY): Primary | ICD-10-CM

## 2019-05-24 NOTE — TELEPHONE ENCOUNTER
Spoke with pt. Reviewed CT scan. He describes slight bad taste/smell in mouth and rare send of some pus near back of throat. No significant nasal drainage or pain. Slight prominence to tonsillar tissues on scan and LNs that appear reactive. Will therefore treat with Augmentin 1 tab BID for 7 days. Rx faced to pharmacy. Pt will have some yogurt daily in addition while on abx to try and counter risk diarrhea with med. He will send me update again in 2 weeks

## 2019-06-03 ENCOUNTER — MYC MEDICAL ADVICE (OUTPATIENT)
Dept: INTERNAL MEDICINE | Facility: CLINIC | Age: 34
End: 2019-06-03

## 2019-06-08 ENCOUNTER — OFFICE VISIT (OUTPATIENT)
Dept: URGENT CARE | Facility: URGENT CARE | Age: 34
End: 2019-06-08
Payer: COMMERCIAL

## 2019-06-08 VITALS
RESPIRATION RATE: 20 BRPM | HEART RATE: 69 BPM | DIASTOLIC BLOOD PRESSURE: 76 MMHG | BODY MASS INDEX: 22.54 KG/M2 | WEIGHT: 172 LBS | SYSTOLIC BLOOD PRESSURE: 110 MMHG | TEMPERATURE: 97.9 F

## 2019-06-08 DIAGNOSIS — L04.9 LYMPHADENITIS, ACUTE: ICD-10-CM

## 2019-06-08 DIAGNOSIS — R22.1 THROAT SWELLING: Primary | ICD-10-CM

## 2019-06-08 DIAGNOSIS — K12.2 UVULITIS: ICD-10-CM

## 2019-06-08 LAB
DEPRECATED S PYO AG THROAT QL EIA: NORMAL
SPECIMEN SOURCE: NORMAL

## 2019-06-08 PROCEDURE — 87880 STREP A ASSAY W/OPTIC: CPT | Performed by: PHYSICIAN ASSISTANT

## 2019-06-08 PROCEDURE — 87081 CULTURE SCREEN ONLY: CPT | Performed by: PHYSICIAN ASSISTANT

## 2019-06-08 PROCEDURE — 99214 OFFICE O/P EST MOD 30 MIN: CPT | Performed by: PHYSICIAN ASSISTANT

## 2019-06-08 RX ORDER — METHYLPREDNISOLONE 4 MG
TABLET, DOSE PACK ORAL
Qty: 21 TABLET | Refills: 0 | Status: SHIPPED | OUTPATIENT
Start: 2019-06-08 | End: 2019-07-26

## 2019-06-08 RX ORDER — AMOXICILLIN 875 MG
875 TABLET ORAL 2 TIMES DAILY
Qty: 20 TABLET | Refills: 0 | Status: SHIPPED | OUTPATIENT
Start: 2019-06-08 | End: 2019-07-26

## 2019-06-09 LAB
BACTERIA SPEC CULT: NORMAL
SPECIMEN SOURCE: NORMAL

## 2019-06-10 ENCOUNTER — OFFICE VISIT (OUTPATIENT)
Dept: INTERNAL MEDICINE | Facility: CLINIC | Age: 34
End: 2019-06-10
Payer: COMMERCIAL

## 2019-06-10 VITALS
RESPIRATION RATE: 16 BRPM | SYSTOLIC BLOOD PRESSURE: 118 MMHG | WEIGHT: 172 LBS | DIASTOLIC BLOOD PRESSURE: 76 MMHG | TEMPERATURE: 98.2 F | OXYGEN SATURATION: 98 % | HEART RATE: 82 BPM | BODY MASS INDEX: 22.54 KG/M2

## 2019-06-10 DIAGNOSIS — R59.1 LA (LYMPHADENOPATHY): ICD-10-CM

## 2019-06-10 DIAGNOSIS — R63.4 WEIGHT LOSS: ICD-10-CM

## 2019-06-10 DIAGNOSIS — J02.9 SORE THROAT: ICD-10-CM

## 2019-06-10 LAB
ALBUMIN SERPL-MCNC: 4.5 G/DL (ref 3.4–5)
ALP SERPL-CCNC: 66 U/L (ref 40–150)
ALT SERPL W P-5'-P-CCNC: 28 U/L (ref 0–70)
ANION GAP SERPL CALCULATED.3IONS-SCNC: 6 MMOL/L (ref 3–14)
AST SERPL W P-5'-P-CCNC: 12 U/L (ref 0–45)
BILIRUB SERPL-MCNC: 1 MG/DL (ref 0.2–1.3)
BUN SERPL-MCNC: 16 MG/DL (ref 7–30)
CALCIUM SERPL-MCNC: 8.9 MG/DL (ref 8.5–10.1)
CHLORIDE SERPL-SCNC: 103 MMOL/L (ref 94–109)
CO2 SERPL-SCNC: 31 MMOL/L (ref 20–32)
CREAT SERPL-MCNC: 0.95 MG/DL (ref 0.66–1.25)
CRP SERPL-MCNC: <2.9 MG/L (ref 0–8)
ERYTHROCYTE [DISTWIDTH] IN BLOOD BY AUTOMATED COUNT: 12.9 % (ref 10–15)
ERYTHROCYTE [SEDIMENTATION RATE] IN BLOOD BY WESTERGREN METHOD: 8 MM/H (ref 0–15)
GFR SERPL CREATININE-BSD FRML MDRD: >90 ML/MIN/{1.73_M2}
GLUCOSE SERPL-MCNC: 102 MG/DL (ref 70–99)
HCT VFR BLD AUTO: 43.8 % (ref 40–53)
HGB BLD-MCNC: 15.4 G/DL (ref 13.3–17.7)
LDH SERPL L TO P-CCNC: 136 U/L (ref 85–227)
MCH RBC QN AUTO: 31.3 PG (ref 26.5–33)
MCHC RBC AUTO-ENTMCNC: 35.2 G/DL (ref 31.5–36.5)
MCV RBC AUTO: 89 FL (ref 78–100)
PLATELET # BLD AUTO: 242 10E9/L (ref 150–450)
POTASSIUM SERPL-SCNC: 4.2 MMOL/L (ref 3.4–5.3)
PROT SERPL-MCNC: 8.2 G/DL (ref 6.8–8.8)
RBC # BLD AUTO: 4.92 10E12/L (ref 4.4–5.9)
SODIUM SERPL-SCNC: 140 MMOL/L (ref 133–144)
WBC # BLD AUTO: 9.5 10E9/L (ref 4–11)

## 2019-06-10 PROCEDURE — 85027 COMPLETE CBC AUTOMATED: CPT | Performed by: INTERNAL MEDICINE

## 2019-06-10 PROCEDURE — 36415 COLL VENOUS BLD VENIPUNCTURE: CPT | Performed by: INTERNAL MEDICINE

## 2019-06-10 PROCEDURE — 83615 LACTATE (LD) (LDH) ENZYME: CPT | Performed by: INTERNAL MEDICINE

## 2019-06-10 PROCEDURE — 99214 OFFICE O/P EST MOD 30 MIN: CPT | Performed by: INTERNAL MEDICINE

## 2019-06-10 PROCEDURE — 86038 ANTINUCLEAR ANTIBODIES: CPT | Performed by: INTERNAL MEDICINE

## 2019-06-10 PROCEDURE — 80053 COMPREHEN METABOLIC PANEL: CPT | Performed by: INTERNAL MEDICINE

## 2019-06-10 PROCEDURE — 85652 RBC SED RATE AUTOMATED: CPT | Performed by: INTERNAL MEDICINE

## 2019-06-10 PROCEDURE — 86140 C-REACTIVE PROTEIN: CPT | Performed by: INTERNAL MEDICINE

## 2019-06-10 NOTE — PATIENT INSTRUCTIONS
Labs was ordered   Referral to  ENT (Dr Wallace Vides). Call for appt for lymph node swelling  Increase caloric intake. If ENT exam negative and weight loss continues, will check thyroid labs and consider CT abdomen

## 2019-06-10 NOTE — PROGRESS NOTES
"Subjective     Germán Moreira is a 34 year old male who presents to clinic today for the following health issues:    HPI   ED/UC Followup:    Facility:  [facility]  Date of visit: 06/10/2019  Reason for visit: Sore throat  Current Status: Stable, but still no appetite      Pt's past medical history, family history, habits, medications and allergies were reviewed with the patient today.  See snap shot for  HCM status. Most recent lab results reviewed with pt. Problem list and histories reviewed & adjusted, as indicated.  Additional history as below:    Patient was seen in urgent care 2 days ago.  Strep test negative but because of patient concerns of infectious issues,  Patient was treated with amoxicillin.  History of some adenopathy thought to be reactive in nature present on CT of the neck May 2019.  Scan showed                                                        \"Slightly prominent, right greater than left, cervical  chain lymph nodes particularly in level IIA and level III.\"     Patient states it is been very hard for him to sleep over the last couple weeks because of worry about the CT findings.  Appetite is down and he has lost about 17 pounds.  History of anxiety.   Patient denies fevers or chills.  Throat feeling better.  Denies ear pain, sinus pain, shortness of breath, chest pain, abdominal pain.  Patient is a non-smoker.  No sick contacts.  Occasional sneezing or clear rhinorrhea.  No sniffing and allergy issues otherwise      Additional ROS:   Constitutional, HEENT, Cardiovascular, Pulmonary, GI and , Neuro, MSK and Psych review of systems/symptoms are otherwise negative or unchanged from previous, except as noted above.      OBJECTIVE:  /76   Pulse 82   Temp 98.2  F (36.8  C) (Oral)   Resp 16   Wt 78 kg (172 lb)   SpO2 98%   BMI 22.54 kg/m     Estimated body mass index is 22.54 kg/m  as calculated from the following:    Height as of 1/18/19: 1.861 m (6' 1.25\").    Weight as of " this encounter: 78 kg (172 lb).     HENT: ear canals and TM's normal and nose and mouth without ulcers or lesions. No OP erythema  Neck: Thyroid normal to palpation. No bruits  Pulm: Lungs clear to auscultation   CV: Regular rates and rhythm  GI: Soft, nontender, Normal active bowel sounds, No hepatosplenomegaly or masses palpable  Ext: Peripheral pulses intact. No edema.  Neuro: Normal strength and tone, sensory exam grossly normal  Nodes: normal ant/post cervical, supraclavicular, axillary, epitrochlear, inguinal nodes     Assessment/Plan: (See plan discussion below for further details)  1. Sore throat  strep test negative.  Given patient's concern for other infectious issues and previous adenopathy seen on CT, will have patient complete antibiotic course     2. LA (lymphadenopathy)  Mild adenopathy seen on previous neck CT.  I do not feel lymph node enlargement today.  Radiology thought previous findings were likely consistent with reactive disease.  Stress regarding this is affecting patient with poor appetite.  Labs as ordered to rule out inflammatory process.  Will refer to ENT for second opinion/reassurance and to see if they wish to repeat imaging neck  - CRP inflammation  - Erythrocyte sedimentation rate auto  - CBC with platelets  - Anti Nuclear Elke IgG by IFA with Reflex  - Comprehensive metabolic panel  - Lactate Dehydrogenase  - OTOLARYNGOLOGY REFERRAL    3. Weight loss  Most likely related to stress/anxiety with #2 but, if weight doesn't begin to rise after hopeful ENT reassurance, will then check thyroid labs and abd imaging    Plan discussion:   Labs was ordered   Referral to  ENT (Dr Wallace Vides). Call for appt for lymph node swelling  Increase caloric intake. If ENT exam negative and weight loss continues, will check thyroid labs and consider CT abdomen       Berry Hancock MD  Internal Medicine Department  [facility]    (Chart documentation was completed, in part, with [employer]  voice-recognition software. Even though reviewed, some grammatical, spelling, and word errors may remain.)

## 2019-06-11 LAB — ANA SER QL IF: NEGATIVE

## 2019-06-11 NOTE — PROGRESS NOTES
SUBJECTIVE:  Germán Moreira is a 34 year old male with a chief complaint of sore throat.  Onset of symptoms was 1 week(s) ago.    Course of illness: still present.  Severity mild and moderate  Current and Associated symptoms: throat paim  Treatment measures tried include OTC Cough med.  Predisposing factors include recent illnes.    Past Medical History:   Diagnosis Date     Depression      Hair loss      Other acne      Allergies   Allergen Reactions     Seafood      anaphylaxis     Social History     Tobacco Use     Smoking status: Never Smoker     Smokeless tobacco: Never Used   Substance Use Topics     Alcohol use: Yes     Comment: casu     Family History   Problem Relation Age of Onset     Cancer Mother         Basal cell CA     Heart Murmur Mother         MVP       ROS:  CONSTITUTIONAL:NEGATIVE for fever, chills, change in weight  INTEGUMENTARY/SKIN: NEGATIVE for worrisome rashes, moles or lesions  EYES: NEGATIVE for vision changes or irritation  ENT/MOUTH: POSITIVE for throat pain  RESP:NEGATIVE for significant cough or SOB  CV: NEGATIVE for chest pain, palpitations or peripheral edema  GI: NEGATIVE for nausea, abdominal pain, heartburn, or change in bowel habits  : NEGATIVE for dysuria  MUSCULOSKELETAL: NEGATIVE for significant arthralgias or myalgia  NEURO: NEGATIVE for weakness, dizziness or paresthesias    OBJECTIVE:   /76 (Cuff Size: Adult Regular)   Pulse 69   Temp 97.9  F (36.6  C) (Oral)   Resp 20   Wt 78 kg (172 lb)   BMI 22.54 kg/m    GENERAL APPEARANCE: healthy, alert and no distress  EYES: EOMI,  PERRL, conjunctiva clear  HENT: TM's normal bilaterally and tonsillar erythema  NECK: supple, non-tender to palpation, no adenopathy noted  RESP: lungs clear to auscultation - no rales, rhonchi or wheezes  CV: regular rates and rhythm, normal S1 S2, no murmur noted  ABDOMEN:  soft, nontender, no HSM or masses and bowel sounds normal  MS:  extremities normal- no gross deformities noted, no  erythema, FROM noted in all extremities  SKIN: no suspicious lesions or rashes    Results for orders placed or performed in visit on 06/08/19   Strep, Rapid Screen   Result Value Ref Range    Specimen Description Throat     Rapid Strep A Screen       NEGATIVE: No Group A streptococcal antigen detected by immunoassay, await culture report.       ASSESSMENT/PLAN      ICD-10-CM    1. Throat swelling R22.1 Strep, Rapid Screen     Beta strep group A culture     methylPREDNISolone (MEDROL DOSEPAK) 4 MG tablet therapy pack   2. Uvulitis K12.2 Strep, Rapid Screen     Beta strep group A culture     amoxicillin (AMOXIL) 875 MG tablet   3. Lymphadenitis, acute L04.9 amoxicillin (AMOXIL) 875 MG tablet           Throat swelling  Uvulitis  Lymphadenitis, acute    PLAN:   See orders in epic.   Symptomatic treat with gargles, lozenges, and OTC analgesic as needed. Follow-up with primary clinic if not improving.  Orders Placed This Encounter     methylPREDNISolone (MEDROL DOSEPAK) 4 MG tablet therapy pack     amoxicillin (AMOXIL) 875 MG tablet       Advisement given that patient will be contagious for the next 24-48 hours after antibiotics initiated

## 2019-06-20 ENCOUNTER — TRANSFERRED RECORDS (OUTPATIENT)
Dept: HEALTH INFORMATION MANAGEMENT | Facility: CLINIC | Age: 34
End: 2019-06-20

## 2019-07-04 ENCOUNTER — MYC MEDICAL ADVICE (OUTPATIENT)
Dept: INTERNAL MEDICINE | Facility: CLINIC | Age: 34
End: 2019-07-04

## 2019-07-22 ENCOUNTER — OFFICE VISIT (OUTPATIENT)
Dept: PSYCHOLOGY | Facility: CLINIC | Age: 34
End: 2019-07-22
Attending: INTERNAL MEDICINE
Payer: COMMERCIAL

## 2019-07-22 DIAGNOSIS — F45.21 ILLNESS ANXIETY DISORDER: Primary | ICD-10-CM

## 2019-07-22 PROCEDURE — 90834 PSYTX W PT 45 MINUTES: CPT | Performed by: PSYCHOLOGIST

## 2019-07-26 ENCOUNTER — OFFICE VISIT (OUTPATIENT)
Dept: INTERNAL MEDICINE | Facility: CLINIC | Age: 34
End: 2019-07-26
Payer: COMMERCIAL

## 2019-07-26 VITALS
DIASTOLIC BLOOD PRESSURE: 74 MMHG | TEMPERATURE: 98.3 F | RESPIRATION RATE: 16 BRPM | WEIGHT: 172 LBS | HEART RATE: 62 BPM | OXYGEN SATURATION: 99 % | BODY MASS INDEX: 22.54 KG/M2 | SYSTOLIC BLOOD PRESSURE: 118 MMHG

## 2019-07-26 DIAGNOSIS — D17.30 LIPOMA OF SKIN AND SUBCUTANEOUS TISSUE: Primary | ICD-10-CM

## 2019-07-26 DIAGNOSIS — R59.9 ENLARGED LYMPH NODES: ICD-10-CM

## 2019-07-26 DIAGNOSIS — F41.9 ANXIETY: ICD-10-CM

## 2019-07-26 PROCEDURE — 99213 OFFICE O/P EST LOW 20 MIN: CPT | Performed by: INTERNAL MEDICINE

## 2019-07-26 NOTE — PROGRESS NOTES
Progress Note - Initial Session    Client Name:  Germán Moreira Date: 7/22/2019         Service Type: Individual  Video Visit: No     Session Start Time: 10:00  Session End Time: 10:45     Session Length: 45    Session #: 1    Attendees: Client attended alone     DATA:  Diagnostic Assessment in progress.  Unable to complete documentation at the conclusion of the first session due to more time needed to complete diagnostic interview.      Interactive Complexity: No  Crisis: No    Intervention:  CBT: coached client on fact-checing strategies and helpful thoughts on which he can focus (e.g. health vs illness/disease)  Supportive therapy: provided active listening, support, and encouragement. Explained to client that he is always welcome to discuss medications to treat symptoms of anxiety with his PCP, however encouraged him to try a few sessions of counseling first and then reassess his interest in a trial of medications.     ASSESSMENT:  Mental Status Assessment:  Appearance:   Appropriate   Eye Contact:   Good   Psychomotor Behavior: Normal   Attitude:   Cooperative  Pleasant   Orientation:   All  Speech   Rate / Production: Normal    Volume:  Normal   Mood:    Anxious   Affect:    Appropriate   Thought Content:  Worry   Thought Form:  Coherent  Logical   Insight:    Good       Safety Issues and Plan for Safety and Risk Management:  Client denies current fears or concerns for personal safety.  Client denies current or recent suicidal ideation or behaviors.  Client denies current or recent homicidal ideation or behaviors.  Client denies current or recent self injurious behavior or ideation.  Client denies other safety concerns.  Recommended that patient call 911 or go to the local ED should there be a change in any of these risk factors.  Client reports there are no firearms in the house.      Diagnostic Criteria:  Illness anxiety disorder      DSM5 Diagnoses: (Sustained by DSM5 Criteria Listed  Above)  Diagnoses: Illness anxiety disorder, provisional  Psychosocial & Contextual Factors: concerns about his health despite lack of medical evidence to suggest illlness/disease  WHODAS 2.0 (12 item)            This questionnaire asks about difficulties due to health conditions. Health conditions  include  disease or illnesses, other health problems that may be short or long lasting,  injuries, mental health or emotional problems, and problems with alcohol or drugs.                     Think back over the past 30 days and answer these questions, thinking about how much  difficulty you had doing the following activities. For each question, please [place] only  one response.    S1 Standing for long periods such as 30 minutes? None =         1   S2 Taking care of household responsibilities? Moderate =   3   S3 Learning a new task, for example, learning how to get to a new place? None =         1   S4 How much of a problem do you have joining community activities (for example, festivals, Congregation or other activities) in the same way as anyone else can? Mild =           2   S5 How much have you been emotionally affected by your health problems? Severe =       4     In the past 30 days, how much difficulty did you have in:   S6 Concentrating on doing something for ten minutes? Mild =           2   S7 Walking a long distance such as a kilometer (or equivalent)? None =         1   S8 Washing your whole body? None =         1   S9 Getting dressed? None =         1   S10 Dealing with people you do not know? None =         1   S11 Maintaining a friendship? None =         1   S12 Your day to day work? Mild =           2     H1 Overall, in the past 30 days, how many days were these difficulties present? Record number of days 15   H2 In the past 30 days, for how many days were you totally unable to carry out your usual activities or work because of any health condition? Record number of days  0   H3 In the past 30 days, not  counting the days that you were totally unable, for how many days did you cut back or reduce your usual activities or work because of any health condition? Record number of days 0       Collateral Reports Completed:  Communicated with: Jerrod Bishop about tranferring client to his care upon completion of DA      PLAN: (Homework, other):  Complete diagnostic interview next session. Client is aware of this writer's departure from [place] and is agreeable to a transfer in care upon completion of DA.      Chhaya Zheng PsyD LP

## 2019-07-26 NOTE — PROGRESS NOTES
Subjective     Germán Moreira is a 34 year old male who presents to clinic today for the following health issues:    HPI   Chief Complaint   Patient presents with     Mass     Patient c/o lump on left forearm with slight tenderness, noticed today      Pt's past medical history, family history, habits, medications and allergies were reviewed with the patient today.  See snap shot for  HCM status. Most recent lab results reviewed with pt. Problem list and histories reviewed & adjusted, as indicated.  Additional history as below:    History of right lymph node enlargement thought to be reactive in nature.  Patient saw ENT for exam.  See note in chart.  Also thought to be reactive in nature and physical exam of the patient's neck by ENT did not feel any lymph node enlargement.  This is made patient very anxious.  Recently started to see a counselor.  Note in chart reviewed.  Does not feel medication needed at this time.  Patient felt a small lump in his left forearm and became concerned that this could be a lymph node and requested appointment for examination today.  No trauma to the forearm.  Minimal tenderness without pain.  Family history of lipomas and patient has been known to have a small abdominal wall lipoma previously which is asymmetric.  Plan regarding previous mild lymph node enlargement right neck has been to repeat a CT scan of the neck in late August for 3-month follow-up.  After previously losing weight due to anxiety, patient has been eating better and weight has been stable over the last couple months.  Denies fevers, chills, night sweats.  No neck pain or abdominal pain.  No current upper respiratory symptoms     Additional ROS:   Constitutional, HEENT, Cardiovascular, Pulmonary, GI and , Neuro, MSK and Psych review of systems/symptoms are otherwise negative or unchanged from previous, except as noted above.      OBJECTIVE:  /74   Pulse 62   Temp 98.3  F (36.8  C) (Oral)   Resp 16   Wt  "78 kg (172 lb)   SpO2 99%   BMI 22.54 kg/m     Estimated body mass index is 22.54 kg/m  as calculated from the following:    Height as of 1/18/19: 1.861 m (6' 1.25\").    Weight as of this encounter: 78 kg (172 lb).     HENT: ear canals and TM's normal and nose and mouth without ulcers or lesions   Neck: . Thyroid normal to palpation. No bruits. No masses palpable  Pulm: Lungs clear to auscultation   CV: Regular rates and rhythm  GI: Soft, nontender, Normal active bowel sounds, No hepatosplenomegaly or masses palpable  Ext: Peripheral pulses intact. No edema.  Approx 4 millimeter mobile nontender lipoma palpable distal left forearm on the palmar side  Node: No cervical, supraclavicular, axillary, inguinal, epitrochlear, pre/post auricular adenopathy palpable  Gen: Slight anxious affect    Assessment/Plan: (See plan discussion below for further details)  1. Lipoma of skin and subcutaneous tissue  Patient counseled regarding benign nature of the lipoma.  Asymptomatic overall.  Patient was just worried that this could be a lymph node  Which is was not. Will monitor    2. Anxiety  Slowly improving.  Patient continue counseling.  Declines need for medication at this time. Mostly related to worrying regarding previous lymph node enlargement.  Patient states he is able to wait for another month to have follow-up CT scan of the neck done.  I informed patient that if anxiety worsens before then, he is to let me know and we will move up the timing of the CT    3. Enlarged lymph nodes  Thought to be related to reactive lymph node.  I am unable to palpate lymph node enlargement on exam today.  Previous ENT physician examination also negative.  Plan is to repeat CT of the neck in 1 month          Berry Hancock MD  Internal Medicine Department  [facility]    (Chart documentation was completed, in part, with [employer] voice-recognition software. Even though reviewed, some grammatical, spelling, and word errors may " remain.)

## 2019-07-27 PROBLEM — F41.9 ANXIETY: Status: ACTIVE | Noted: 2019-07-27

## 2019-07-29 ENCOUNTER — OFFICE VISIT (OUTPATIENT)
Dept: PSYCHOLOGY | Facility: CLINIC | Age: 34
End: 2019-07-29
Attending: INTERNAL MEDICINE
Payer: COMMERCIAL

## 2019-07-29 DIAGNOSIS — F45.21 ILLNESS ANXIETY DISORDER: Primary | ICD-10-CM

## 2019-07-29 PROCEDURE — 90791 PSYCH DIAGNOSTIC EVALUATION: CPT | Performed by: PSYCHOLOGIST

## 2019-07-29 NOTE — PROGRESS NOTES
"                                                                                                                                                                        Adult Intake Structured Interview  Standard Diagnostic Assessment      CLIENT'S NAME: Germán Moreira  MRN:   [id number]  :   1985  ACCT. NUMBER: [id number]  DATE OF SERVICE: 19  VIDEO VISIT: No    Identifying Information:  Client is a 34 year old, ,  male. Client was referred for counseling by his PCP, Blaise Hancock MD. Client is currently employed full time as a  with [employer] and reports he is able to function appropriately at work, and that his job is a good fit for his skills and personality. Reported that work has been a good source of distraction from worry thoughts about his health. Client attended the session alone.       Client's Statement of Presenting Concern:  Client reports the reason for seeking therapy at this time as \"frequent anxiety that is starting to affect my personal life\". Reported that his anxiety is focused specifically on his health and if he has undetected cancer. Reported he has a family history of cancer, which he believes has been contributing to his fears. Client stated that his symptoms have resulted in the following functional impairments: home life with spouse and kids, management of the household and or completion of tasks and self-care. Reported that his family has noticed that he seems less engaged/joyful than usual. Reported that he often feels worn out at the end of the day and is less involved in self-care or his usual household/childrearing tasks than usual; client links this to being preoccupied about becoming ill or having an undetected illness.       History of Presenting Concern:  Client reports that these problem(s) began around 2018, when he became ill for a few weeks as the result of an inflamed liver. Reported that this medical team concluded that " "the cause was likely some kind of viral infection that his since resolved. Reported that his labs returned normal, which he finds reassuring, but has since been troubled by thoughts of being sick and not being able to take care of his family. Reported that he \"never really been anxious\" and has \"always been pretty healthy\" until that point in time. A few months later, client started having chest/arm pain, so he went to the doctor. Reported that there was no underlying medical explanation and his doctor told him it was likely stress related. Reported that he started to get more edgy after this event. A few weeks later he had the flu, then after recovering from the flu had an episode of fever/diarrhea/body pains that lasted a few days, and then after recovering from that, he had some throat pain and swollen lymph nodes. Reported that the swollen lymph node remained for about 4 weeks and then he went to see his doctor.  His doctor has ruled out any underlying medical conditions but has agreed to do a follow up scan in August to monitor for changes/growth. Client reported that despite reassurances, it is hard for him to let go of health-related worries. Reported that the frequency of recent health issues, given a relative lack of any previous health problems, has left him concerned that there is some underlying problem that has not yet been identified. Reported that preoccupation with his health or having an undetected health issue is making it hard for him to focus at work (not noticeable to others), but that the primary deficits in functioning are occurring at home and during time with his family. Client has attempted to resolve these concerns in the past through visiting his PCP. Client reports that other professional(s) are involved in providing support / services. His PCP has also suggested that client read \"Mind Over Mood.\"      Social History:  Client reported he grew up in [place], [place]. They were the second " "born of 3 children. This is an intact family and parents remain . Client reported that his childhood was \"good family, supporting parents, active in sports, good friends\". Client described his current relationships with family of origin as close with frequent contact, but acknowledged that his life is much busier as a parent of 2 young kids.    Client reported a history of 1 marriage. Client has been  for 8 years. Client reported having 2 children, ages 4 and 1. Client identified some stable and meaningful social connections. Client reported that he has not been involved with the legal system.  Client's highest education level was graduate school ([employer]).  Client did not identify any learning problems. There are no ethnic, cultural or Islam factors that may be relevant for therapy. Client identified his preferred language to be English. Client reported he does not need the assistance of an  or other support involved in therapy. Modifications will not be used to assist communication in therapy. Client did not serve in the .     Client reports family history includes Cancer in his mother; Heart Murmur in his mother.    Mental Health History:  Client reported the following biological family members or relatives with mental health issues: Maternal Grandmother experienced Anxiety and Sister experienced Depression.  Client has not been previously diagnosed with a mental health diagnosis.  Client has not received mental health services in the past.  Hospitalizations: None.  Client is not currently receiving any mental health services.      Chemical Health History:  Client reported no family history of chemical health issues. Client has not received chemical dependency treatment in the past. Client is not currently receiving any chemical dependency treatment. Client reports no problems as a result of their drinking / drug use.      Client Reports:  Client reports using alcohol 2-3 times " per week and has 1-2 drinks at a time. Patient first started drinking at age did not report.  Patient reported date of last use was last weekend.  Patient reports heaviest use was when he was in college. Reported that he does not consume more than 1-2 drinks at a time anymore because he does not like how that makes him feel.  Client denies using tobacco.  Client denies using marijuana.  Client reports using caffeine 1 times per day and drinks 2-3 cups of coffee at a time. Patient started using caffeine at age did not report.  Client denies using street drugs.  Client denies the non-medical use of prescription or over the counter drugs.    CAGE: None of the patient's responses to the CAGE screening were positive / Negative CAGE score   Based on the negative Cage-Aid score and clinical interview there  are not indications of drug or alcohol abuse.    Discussed the general effects of drugs and alcohol on health and well-being. Therapist gave client printed information about the effects of chemical use on his health and well being.      Significant Losses / Trauma / Abuse / Neglect Issues:  There are no indications or report of: significant losses, trauma, abuse or neglect.    Issues of possible neglect are not present.      Medical Issues:  Client has had a physical exam to rule out medical causes for current symptoms. Date of last physical exam was within the past year. Client was encouraged to follow up with PCP if symptoms were to develop. The client has a [facility] Provider, who is named Berry Hancock. The client reports not having a psychiatrist. Client reports no current medical concerns. The client denies the presence of chronic or episodic pain. There are significant nutritional concerns, in that client was not eating very much when he was experiencing a lot of stress and anxiety about his health, resulting in some weight loss. Reported that this has improved lately.    Client reports not having any  current medications.    Client Allergies:  Allergies   Allergen Reactions     Seafood      anaphylaxis         Medical History:  Past Medical History:   Diagnosis Date     Anxiety 7/27/2019     Depression      Hair loss      Other acne          Medication Adherence:  N/A - Client does not have prescribed psychiatric medications.    Client was provided recommendation to follow-up with prescribing physician.    Mental Status Assessment:  Appearance:   Appropriate   Eye Contact:   Good   Psychomotor Behavior: Normal   Attitude:   Cooperative   Orientation:   All  Speech   Rate / Production: Normal    Volume:  Normal   Mood:    Anxious   Affect:    Appropriate   Thought Content:  Worry   Thought Form:  Coherent  Logical   Insight:    Good       Review of Symptoms:  Depression: No symptoms  Savi:  No symptoms  Psychosis: No symptoms  Anxiety: Worries  Panic:  No symptoms  Post Traumatic Stress Disorder: No symptoms  Obsessive Compulsive Disorder: No symptoms  Eating Disorder: No symptoms  Oppositional Defiant Disorder: No symptoms  ADD / ADHD: No symptoms  Conduct Disorder: No symptoms      Safety Assessment:    History of Safety Concerns:   Client denied a history of suicidal ideation.    Client denied a history of suicide attempts.    Client denied a history of homicidal ideation.    Client denied a history of self-injurious ideation and behaviors.    Client denied a history of personal safety concerns.    Client denied a history of assaultive behaviors.        Current Safety Concerns:  Client denies current suicidal ideation.    Client denies current homicidal ideation and behaviors.  Client denies current self-injurious ideation and behaviors.    Client denies current concerns for personal safety.    Client reports the following protective factors: positive relationships positive social network and positive family connections, forward/future oriented thinking, dedication to family/friends, safe and stable  "environment, purpose as a dad, secure attachment, living with other people, daily obligations, structured day, effective problem-solving skills, committment to well-being, sense of meaning, positive social skills and financial stability    Client reports there are no firearms in the house.     Plan for Safety and Risk Management:  Recommended that patient call 911 or go to the local ED should there be a change in any of these risk factors.    Client's Strengths and Limitations:  Client identified the following strengths or resources that will help him succeed in counseling: prabhjot / spirituality, family support and intelligence. Client identified the following supports: family and prabhjot. Things that may interfere with the client's success in counseling include: \"nothing comes to mind\".      Diagnostic Criteria:  Illness anxiety disorder      Functional Status:  Client's symptoms are causing reduced functional status in the following areas: Activities of Daily Living - self-care, has to push himself harder to get things done  Social / Relational - \"less present\" in his family interactions      DSM5 Diagnoses: (Sustained by DSM5 Criteria Listed Above)  Diagnoses: Illness anxiety disorder  Psychosocial & Contextual Factors: concerns about his health despite lack of medical evidence to suggest illlness/disease  WHODAS 2.0 (12 item)            This questionnaire asks about difficulties due to health conditions. Health conditions  include  disease or illnesses, other health problems that may be short or long lasting,  injuries, mental health or emotional problems, and problems with alcohol or drugs.                     Think back over the past 30 days and answer these questions, thinking about how much  difficulty you had doing the following activities. For each question, please [place] only  one response.    S1 Standing for long periods such as 30 minutes? None =         1   S2 Taking care of household responsibilities? " Moderate =   3   S3 Learning a new task, for example, learning how to get to a new place? None =         1   S4 How much of a problem do you have joining community activities (for example, festivals, Adventism or other activities) in the same way as anyone else can? Mild =           2   S5 How much have you been emotionally affected by your health problems? Severe =       4     In the past 30 days, how much difficulty did you have in:   S6 Concentrating on doing something for ten minutes? Mild =           2   S7 Walking a long distance such as a kilometer (or equivalent)? None =         1   S8 Washing your whole body? None =         1   S9 Getting dressed? None =         1   S10 Dealing with people you do not know? None =         1   S11 Maintaining a friendship? None =         1   S12 Your day to day work? Mild =           2     H1 Overall, in the past 30 days, how many days were these difficulties present? Record number of days 15   H2 In the past 30 days, for how many days were you totally unable to carry out your usual activities or work because of any health condition? Record number of days  0   H3 In the past 30 days, not counting the days that you were totally unable, for how many days did you cut back or reduce your usual activities or work because of any health condition? Record number of days 0     Attendance Agreement:  Client has signed Attendance Agreement:Yes      Collaboration:  Collaboration / coordination of treatment will be initiated with the following support professionals: primary care physician.      Preliminary Treatment Plan:  The client reports no currently identified Adventism, ethnic or cultural issues relevant to therapy.     services are not indicated.    Modifications to assist communication are not indicated.    The concerns identified by the client will be addressed in therapy.    Initial Treatment will focus on: illness anxiety.    As a preliminary treatment goal, client  will learn strategies to manage illness anxiety.    The focus of initial interventions will be to alleviate anxiety.    Due to this writer's departure from [facility], client has been referred to [facility] for follow up visits. Client has been contacted by scheduling staff to set up those visits, and they are awaiting his response.    A Release of Information is not needed at this time.    Report to child / adult protection services was NA.    Client will have access to their [facility]' medical record.    Chhaya Zheng PsyD LP  July 29, 2019

## 2019-08-15 ENCOUNTER — TELEPHONE (OUTPATIENT)
Dept: INTERNAL MEDICINE | Facility: CLINIC | Age: 34
End: 2019-08-15

## 2019-08-15 DIAGNOSIS — R59.0 CERVICAL ADENOPATHY: Primary | ICD-10-CM

## 2019-08-15 NOTE — TELEPHONE ENCOUNTER
Informed patient that Provider will be ordering a Follow Up CT of the neck. Order Pending for approval.

## 2019-08-20 ENCOUNTER — OFFICE VISIT (OUTPATIENT)
Dept: PSYCHOLOGY | Facility: CLINIC | Age: 34
End: 2019-08-20
Payer: COMMERCIAL

## 2019-08-20 DIAGNOSIS — F45.21 ILLNESS ANXIETY DISORDER: Primary | ICD-10-CM

## 2019-08-20 PROCEDURE — 90834 PSYTX W PT 45 MINUTES: CPT | Performed by: SOCIAL WORKER

## 2019-08-20 ASSESSMENT — ANXIETY QUESTIONNAIRES
7. FEELING AFRAID AS IF SOMETHING AWFUL MIGHT HAPPEN: NOT AT ALL
GAD7 TOTAL SCORE: 1
IF YOU CHECKED OFF ANY PROBLEMS ON THIS QUESTIONNAIRE, HOW DIFFICULT HAVE THESE PROBLEMS MADE IT FOR YOU TO DO YOUR WORK, TAKE CARE OF THINGS AT HOME, OR GET ALONG WITH OTHER PEOPLE: SOMEWHAT DIFFICULT
1. FEELING NERVOUS, ANXIOUS, OR ON EDGE: NOT AT ALL
2. NOT BEING ABLE TO STOP OR CONTROL WORRYING: NOT AT ALL
6. BECOMING EASILY ANNOYED OR IRRITABLE: SEVERAL DAYS
3. WORRYING TOO MUCH ABOUT DIFFERENT THINGS: NOT AT ALL
5. BEING SO RESTLESS THAT IT IS HARD TO SIT STILL: NOT AT ALL

## 2019-08-20 ASSESSMENT — PATIENT HEALTH QUESTIONNAIRE - PHQ9
5. POOR APPETITE OR OVEREATING: NOT AT ALL
SUM OF ALL RESPONSES TO PHQ QUESTIONS 1-9: 8

## 2019-08-20 NOTE — PROGRESS NOTES
Progress Note    Patient Name: Germán Moreira  Date: 8.20.19         Service Type: Individual  Video Visit: No     Session Start Time: 8:00am  Session End Time: 8:50am     Session Length: 50 minutes    Session #: 1 (with this writer) (2 with previous [facility] therapist)    Attendees: Client attended alone     Treatment Plan Last Reviewed: not completed yet  PHQ-9 / FRANCE-7 : reviewed    DATA  Interactive Complexity: No  Crisis: No       Progress Since Last Session (Related to Symptoms / Goals / Homework):   Symptoms: Improving. Pt states that he is becoming more aware of what increases his distress level when he has obsessive intrusive thoughts. He reports getting relief when he sleeps (and does not have to think about intrusive thoughts) as well as when he gets reassurance from medical providers that he does not have an illness.     Homework: Achieved / completed to satisfaction. Pt was able to think about how he can increase his activity level to decrease anxiety related to having an illness.      Episode of Care Goals: Minimal progress - PREPARATION (Decided to change - considering how); Intervened by negotiating a change plan and determining options / strategies for behavior change, identifying triggers, exploring social supports, and working towards setting a date to begin behavior change     Current / Ongoing Stressors and Concerns: Reviewed pt's DA and collected information regarding pt's current symptoms and history of symptoms. Discussed family members who have contracted illnesses including his grandfather who passed away from Leukemia 6 years ago and pt's aunt who was diagnosed with cancer 6 years ago. Discussed how pt developed a strong belief that he had cancer after having an inflamed liver September 2019. Discussed how pt's received a call from a doctor wanting to take additional testing after having an inflamed liver. Pt states that his levels increased for a  "short period of time. Around this time he thought he had cancer and was stunned that \"he was not immortal and something bad can happen to him at anytime.\" Pt states that his levels decreased shortly after that, but he began being hypersensative to feelings in his body after that experience. Most recently, pt is concerned about lymph nodes in his throat and other parts of the body. Pt states that his aunt discovered she had cancer through have an increased lymph node and he has been looking for evidence to confirm that he has cancer. Discussed that pt's primary concern is the thought of my daughters not having someone to support them if I am not there.\" Pt has noticed periods of relief followed by isolation, withdrawal, and disinterest in eating after feeling discomfort in his body. For the remainder of the visit, spent time on education pt on defusion techniques to \"defuse\" from unhelpful thoughts.     Treatment Objective(s) Addressed in This Session:   use defusion strategy daily to reduce intrusive thoughts     Intervention:   ACT: Prrovided educational materials on ACT, including cognitive defusion strategies, expansion strategies,  our thoughts are not always 100% true and we do not need to always do what our thoughts tell us to do. Processed that after defusing from unhelpful thoughts we can choose to engage or participate in behavior that brings us closer to our values.      ASSESSMENT: Current Emotional / Mental Status (status of significant symptoms):   Risk status (Self / Other harm or suicidal ideation)   Patient denies current fears or concerns for personal safety.   Patient denies current or recent suicidal ideation or behaviors.   Patient denies current or recent homicidal ideation or behaviors.   Patient denies current or recent self injurious behavior or ideation.   Patient denies other safety concerns.   Patient reports there has been no change in risk factors since their last session.     Patient " "reports there has been no change in protective factors since their last session.     Recommended that patient call 911 or go to the local ED should there be a change in any of these risk factors.     Appearance:   Appropriate    Eye Contact:   Fair    Psychomotor Behavior: Normal    Attitude:   Cooperative    Orientation:   All   Speech    Rate / Production: Normal     Volume:  Normal    Mood:    Anxious , somewhat   Affect:    Worrisome    Thought Content:  Clear    Thought Form:  Coherent  Goal Directed  Logical    Insight:    Fair      Medication Review:   No current psychiatric medications prescribed     Medication Compliance:   NA     Changes in Health Issues:   Yes: feeling of lymph nodes, Associated Psychological Distress     Chemical Use Review:   Substance Use: Chemical use reviewed, no active concerns identified      Tobacco Use: No current tobacco use.      Diagnosis:  1. Illness anxiety disorder      Collateral Reports Completed:   Not Applicable    PLAN: (Patient Tasks / Therapist Tasks / Other): Pt will begin to acknowledge when he is having anxiety and begin to practice cognitive defusion strategies such as, \"I am having the thought, I am having the feeling, I am having the urge...\" when having intrusive thoughts.      DIONNE Su, [facility], 8.20.19     Note reviewed and clinical supervision by DIONNE Marquez [facility] 8/23/2019                       ______________________________________________________________________  "

## 2019-08-21 ASSESSMENT — ANXIETY QUESTIONNAIRES: GAD7 TOTAL SCORE: 1

## 2019-08-23 ENCOUNTER — MYC MEDICAL ADVICE (OUTPATIENT)
Dept: INTERNAL MEDICINE | Facility: CLINIC | Age: 34
End: 2019-08-23

## 2019-08-27 ENCOUNTER — HOSPITAL ENCOUNTER (OUTPATIENT)
Dept: CT IMAGING | Facility: CLINIC | Age: 34
Discharge: HOME | End: 2019-08-27
Attending: INTERNAL MEDICINE | Admitting: INTERNAL MEDICINE
Payer: COMMERCIAL

## 2019-08-27 DIAGNOSIS — R59.0 CERVICAL ADENOPATHY: ICD-10-CM

## 2019-08-27 PROCEDURE — 25000125 ZZHC RX 250: Performed by: INTERNAL MEDICINE

## 2019-08-27 PROCEDURE — 70491 CT SOFT TISSUE NECK W/DYE: CPT

## 2019-08-27 PROCEDURE — 25000128 H RX IP 250 OP 636: Performed by: INTERNAL MEDICINE

## 2019-08-27 RX ORDER — IOPAMIDOL 755 MG/ML
80 INJECTION, SOLUTION INTRAVASCULAR ONCE
Status: COMPLETED | OUTPATIENT
Start: 2019-08-27 | End: 2019-08-27

## 2019-08-27 RX ADMIN — SODIUM CHLORIDE 60 ML: 9 INJECTION, SOLUTION INTRAVENOUS at 16:41

## 2019-08-27 RX ADMIN — IOPAMIDOL 80 ML: 755 INJECTION, SOLUTION INTRAVENOUS at 16:41

## 2019-08-28 ENCOUNTER — TELEPHONE (OUTPATIENT)
Dept: INTERNAL MEDICINE | Facility: CLINIC | Age: 34
End: 2019-08-28

## 2019-08-28 DIAGNOSIS — R59.1 LA (LYMPHADENOPATHY): Primary | ICD-10-CM

## 2019-08-28 LAB — RAD FLAG-ADDENDUM: ABNORMAL

## 2019-08-28 NOTE — TELEPHONE ENCOUNTER
Spoke with pt  Re: CT findings and radiology recommendation. Given persistence of adenopathy, will proceed with pt getting LN biopsy. Has seen Dr Vides from ENT previously. Will speak with Dr Vides tomorrow (he will be at  office [phone] per his staff)  to update him and have his office then contact pt to schedule f/u appt and, once future surgical date set, then [facility] and I will then get pt worked in for preop appt

## 2019-08-28 NOTE — TELEPHONE ENCOUNTER
Triage received call regarding CT urgent Findings PCP please advise.     Radha CARRERO RN, BSN, PHN

## 2019-08-30 NOTE — TELEPHONE ENCOUNTER
Spoke with Dr Vides earlier today and discussed CT report and need for bx and requested his office  Schedule pt for appt. Spoke with pt  Now and he stated has appt with Dr Vides next Tuesday. Told pt to contact me via [employer] after  future surgery procedure scheduled and will arrange for preop appt

## 2019-09-03 ENCOUNTER — MYC MEDICAL ADVICE (OUTPATIENT)
Dept: INTERNAL MEDICINE | Facility: CLINIC | Age: 34
End: 2019-09-03

## 2019-09-03 ENCOUNTER — TRANSFERRED RECORDS (OUTPATIENT)
Dept: HEALTH INFORMATION MANAGEMENT | Facility: CLINIC | Age: 34
End: 2019-09-03

## 2019-09-16 ENCOUNTER — OFFICE VISIT (OUTPATIENT)
Dept: INTERNAL MEDICINE | Facility: CLINIC | Age: 34
End: 2019-09-16
Payer: COMMERCIAL

## 2019-09-16 VITALS
SYSTOLIC BLOOD PRESSURE: 118 MMHG | HEART RATE: 71 BPM | TEMPERATURE: 98.6 F | RESPIRATION RATE: 16 BRPM | DIASTOLIC BLOOD PRESSURE: 74 MMHG | WEIGHT: 174 LBS | OXYGEN SATURATION: 99 % | BODY MASS INDEX: 22.8 KG/M2

## 2019-09-16 DIAGNOSIS — Z01.818 PREOP GENERAL PHYSICAL EXAM: Primary | ICD-10-CM

## 2019-09-16 PROCEDURE — 99214 OFFICE O/P EST MOD 30 MIN: CPT | Performed by: INTERNAL MEDICINE

## 2019-09-16 NOTE — PROGRESS NOTES
History of  [address] [address]  [phone]  Dept: [phone]    PRE-OP EVALUATION:  Today's date: 2019    Germán Moreira (: 1985) presents for pre-operative evaluation assessment as requested by Dr. Vides.  He requires evaluation and anesthesia risk assessment prior to undergoing surgery/procedure for treatment of Swollen Lymph Nodes .    Fax number for surgical facility: [phone][facility]  Primary Physician: Berry Hancock  Type of Anesthesia Anticipated: General    Patient has a Health Care Directive or Living Will:  NO    Preop Questions 2019   Who is doing your surgery? Dr Wallace Vides   What are you having done? Lymph node biopsy   Date of Surgery/Procedure: 2019 at 7:45 am   Facility or Hospital where procedure/surgery will be performed: [facility]   1.  Do you have a history of Heart attack, stroke, stent, coronary bypass surgery, or other heart surgery? No   2.  Do you ever have any pain or discomfort in your chest? No   3.  Do you have a history of  Heart Failure? No   4.   Are you troubled by shortness of breath when:  walking on a level surface, or up a slight hill, or at night? No   5.  Do you currently have a cold, bronchitis or other respiratory infection? No   6.  Do you have a cough, shortness of breath, or wheezing? No   7.  Do you sometimes get pains in the calves of your legs when you walk? No   8. Do you or anyone in your family have previous history of blood clots? No   9.  Do you or does anyone in your family have a serious bleeding problem such as prolonged bleeding following surgeries or cuts? No   10. Have you ever had problems with anemia or been told to take iron pills? No   11. Have you had any abnormal blood loss such as black, tarry or bloody stools? No   12. Have you ever had a blood transfusion? No   13. Have you or any of your relatives ever had problems with  "anesthesia? No   14. Do you have sleep apnea, excessive snoring or daytime drowsiness? No   15. Do you have any prosthetic heart valves? No   16. Do you have prosthetic joints? No         HPI:     HPI related to upcoming procedure:   Hx mild LAD neck May 2019 after URI sx. LN sized has persisted without other worsening  Despite resolution of URI sx so pt underwent repeat CT scan neck in Aug 2019 showing:    \"Lymph nodes: Again seen is a mildly enlarged right level IIa node  measuring 2.5 cm, appearing overall similar in morphology and  configuration to the most recent examination. There is an additional  right level IIa node just below the aforementioned node, measuring up  to approximately 2.3 cm in craniocaudal dimension (series 6 image 62).  There are a few small, mildly prominent left level II and III lymph  nodes, appearing overall similar in size and distribution to the most  recent exam. No definite new or enlarging cervical lymphadenopathy. No  necrotic lymph nodes.\"    Lab work-up without evidence for cause. Because of pt's age, lack of mild LN enlargement resolution and some pt anxiety related to lack of specific dx, decision made for pt to undergo surgical removal of LN in question  for pathology look and definitive diagnosis.  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  Current Outpatient Medications   Medication Sig Dispense Refill     EPINEPHrine (EPIPEN) 0.3 MG/0.3ML injection Inject " 0.3 mLs (0.3 mg) into the muscle once as needed for anaphylaxis 1 each 11     triamcinolone (KENALOG) 0.1 % ointment        OTC products: None, except as noted above    Allergies   Allergen Reactions     Seafood      anaphylaxis      Latex Allergy: NO    Social History     Tobacco Use     Smoking status: Never Smoker     Smokeless tobacco: Never Used   Substance Use Topics     Alcohol use: Yes     Comment: casu     History   Drug Use No       REVIEW OF SYSTEMS:   CONSTITUTIONAL: NEGATIVE for fever, chills. Weigh down 16 pounds with stress weight loss. Up 2 pounds more recently  INTEGUMENTARY/SKIN: NEGATIVE for worrisome rashes, moles or lesions  EYES: NEGATIVE for vision changes or irritation  ENT/MOUTH: NEGATIVE for ear, mouth and throat problems  RESP: NEGATIVE for significant cough or SOB  CV: NEGATIVE for chest pain, palpitations or peripheral edema  GI: NEGATIVE for nausea, abdominal pain, heartburn, or change in bowel habits  : NEGATIVE for frequency, dysuria, or hematuria  MUSCULOSKELETAL: NEGATIVE for significant arthralgias or myalgia  NEURO: NEGATIVE for weakness, dizziness or paresthesias  ENDOCRINE: NEGATIVE for temperature intolerance, skin/hair changes  HEME: NEGATIVE for bleeding problems  PSYCHIATRIC:  POSITIVE for anxiety with  ? dx.  Pt seeing a therapist. Had loss of appetite originally with this that caused weight loss but now improving    EXAM:   /74   Pulse 71   Temp 98.6  F (37  C) (Temporal)   Resp 16   Wt 78.9 kg (174 lb)   SpO2 99%   BMI 22.80 kg/m       HENT: ear canals and TM's normal and nose and mouth without ulcers or lesions   Neck:  Minimal enlargement right level 2 LN without tenderness. Other LNs feel normal in size. Thyroid normal to palpation. No bruits  Pulm: Lungs clear to auscultation   CV: Regular rates and rhythm  GI: Soft, nontender, Normal active bowel sounds, No hepatosplenomegaly or masses palpable  Ext: Peripheral pulses intact. No edema.  Neuro: Normal  strength and tone, sensory exam grossly normal      DIAGNOSTICS:   EKG: Not indicated due to non-vascular surgery and low risk of event (age <65 and without cardiac risk factors)    Recent Labs   Lab Test 06/10/19  1726 01/18/19  1415  09/25/18  1511   HGB 15.4 15.5  --   --     202  --   --    INR  --   --   --  1.05    137   < > 135   POTASSIUM 4.2 3.6   < > 4.4   CR 0.95 0.90   < > 1.06    < > = values in this interval not displayed.        IMPRESSION:   Reason for surgery/procedure:  Right level 2 adenopathy  Diagnosis/reason for consult:   1. Anxiety - controlled with outpt counseling/therapy. Not on meds at this time    The proposed surgical procedure is considered LOW risk.    REVISED CARDIAC RISK INDEX  The patient has the following serious cardiovascular risks for perioperative complications such as (MI, PE, VFib and 3  AV Block):  No serious cardiac risks  INTERPRETATION: 0 risks: Class I (very low risk - 0.4% complication rate)    The patient has the following additional risks for perioperative complications:  No identified additional risks         RECOMMENDATIONS:     APPROVAL GIVEN to proceed with proposed procedure, without further diagnostic evaluation     NPO after midnight prior to surgery  Pt counseled to avoid NSAIDS between now and surgery   Further management per pathology results     Signed Electronically by: Berry Hancock MD    Copy of this evaluation report is provided to requesting physician.    [facility] Preop Guidelines    Revised Cardiac Risk Index

## 2019-09-18 ENCOUNTER — HOSPITAL PATHOLOGY (OUTPATIENT)
Dept: OTHER | Facility: CLINIC | Age: 34
End: 2019-09-18

## 2019-09-19 LAB — COPATH REPORT: NORMAL

## 2019-09-20 LAB — COPATH REPORT: NORMAL

## 2019-09-21 LAB
COPATH REPORT: NORMAL
COPATH REPORT: NORMAL

## 2019-10-01 ENCOUNTER — HEALTH MAINTENANCE LETTER (OUTPATIENT)
Age: 34
End: 2019-10-01

## 2020-01-21 ENCOUNTER — TRANSFERRED RECORDS (OUTPATIENT)
Dept: HEALTH INFORMATION MANAGEMENT | Facility: CLINIC | Age: 35
End: 2020-01-21

## 2020-03-22 ENCOUNTER — HEALTH MAINTENANCE LETTER (OUTPATIENT)
Age: 35
End: 2020-03-22

## 2020-08-31 ENCOUNTER — OFFICE VISIT (OUTPATIENT)
Dept: INTERNAL MEDICINE | Facility: CLINIC | Age: 35
End: 2020-08-31
Payer: COMMERCIAL

## 2020-08-31 ENCOUNTER — TELEPHONE (OUTPATIENT)
Dept: INTERNAL MEDICINE | Facility: CLINIC | Age: 35
End: 2020-08-31

## 2020-08-31 VITALS
OXYGEN SATURATION: 97 % | HEIGHT: 72 IN | TEMPERATURE: 97 F | WEIGHT: 182 LBS | HEART RATE: 63 BPM | SYSTOLIC BLOOD PRESSURE: 116 MMHG | DIASTOLIC BLOOD PRESSURE: 70 MMHG | BODY MASS INDEX: 24.65 KG/M2 | RESPIRATION RATE: 16 BRPM

## 2020-08-31 DIAGNOSIS — Z00.00 ENCOUNTER FOR ROUTINE ADULT HEALTH EXAMINATION WITHOUT ABNORMAL FINDINGS: Primary | ICD-10-CM

## 2020-08-31 DIAGNOSIS — Z91.013 ALLERGY TO SEAFOOD: ICD-10-CM

## 2020-08-31 LAB
ALBUMIN SERPL-MCNC: 3.8 G/DL (ref 3.4–5)
ALP SERPL-CCNC: 67 U/L (ref 40–150)
ALT SERPL W P-5'-P-CCNC: 28 U/L (ref 0–70)
ANION GAP SERPL CALCULATED.3IONS-SCNC: 2 MMOL/L (ref 3–14)
AST SERPL W P-5'-P-CCNC: 15 U/L (ref 0–45)
BILIRUB SERPL-MCNC: 1.1 MG/DL (ref 0.2–1.3)
BUN SERPL-MCNC: 16 MG/DL (ref 7–30)
CALCIUM SERPL-MCNC: 8.5 MG/DL (ref 8.5–10.1)
CHLORIDE SERPL-SCNC: 106 MMOL/L (ref 94–109)
CHOLEST SERPL-MCNC: 157 MG/DL
CO2 SERPL-SCNC: 30 MMOL/L (ref 20–32)
CREAT SERPL-MCNC: 0.92 MG/DL (ref 0.66–1.25)
ERYTHROCYTE [DISTWIDTH] IN BLOOD BY AUTOMATED COUNT: 12.7 % (ref 10–15)
GFR SERPL CREATININE-BSD FRML MDRD: >90 ML/MIN/{1.73_M2}
GLUCOSE SERPL-MCNC: 91 MG/DL (ref 70–99)
HCT VFR BLD AUTO: 40.2 % (ref 40–53)
HDLC SERPL-MCNC: 54 MG/DL
HGB BLD-MCNC: 13.7 G/DL (ref 13.3–17.7)
LDLC SERPL CALC-MCNC: 94 MG/DL
MCH RBC QN AUTO: 31.1 PG (ref 26.5–33)
MCHC RBC AUTO-ENTMCNC: 34.1 G/DL (ref 31.5–36.5)
MCV RBC AUTO: 91 FL (ref 78–100)
NONHDLC SERPL-MCNC: 103 MG/DL
PLATELET # BLD AUTO: 213 10E9/L (ref 150–450)
POTASSIUM SERPL-SCNC: 4.1 MMOL/L (ref 3.4–5.3)
PROT SERPL-MCNC: 7.2 G/DL (ref 6.8–8.8)
RBC # BLD AUTO: 4.4 10E12/L (ref 4.4–5.9)
SODIUM SERPL-SCNC: 138 MMOL/L (ref 133–144)
TRIGL SERPL-MCNC: 47 MG/DL
WBC # BLD AUTO: 6.9 10E9/L (ref 4–11)

## 2020-08-31 PROCEDURE — 36415 COLL VENOUS BLD VENIPUNCTURE: CPT | Performed by: INTERNAL MEDICINE

## 2020-08-31 PROCEDURE — 99395 PREV VISIT EST AGE 18-39: CPT | Performed by: INTERNAL MEDICINE

## 2020-08-31 PROCEDURE — 80053 COMPREHEN METABOLIC PANEL: CPT | Performed by: INTERNAL MEDICINE

## 2020-08-31 PROCEDURE — 80061 LIPID PANEL: CPT | Performed by: INTERNAL MEDICINE

## 2020-08-31 PROCEDURE — 85027 COMPLETE CBC AUTOMATED: CPT | Performed by: INTERNAL MEDICINE

## 2020-08-31 RX ORDER — EPINEPHRINE 0.3 MG/.3ML
0.3 INJECTION SUBCUTANEOUS
Qty: 1 EACH | Refills: 11 | Status: SHIPPED | OUTPATIENT
Start: 2020-08-31 | End: 2021-10-29

## 2020-08-31 ASSESSMENT — MIFFLIN-ST. JEOR: SCORE: 1798.55

## 2020-08-31 NOTE — PATIENT INSTRUCTIONS
Continue current meds  Prescriptions refilled.    Labs today as ordered  I would recommend you receive an influenza (flu) vaccine  this Fall (September or October)

## 2020-08-31 NOTE — LETTER
[facility]  [address] [place] [address]  [phone]      8/31/2020       Germán Moreira  [address] [place] [id number]        Dear Germán,  Enclosed is the original copy of your Biometric Form. A copy has been faxed to [phone] and scanned into your chart. Call the number listed above with any questions.      Sincerely,      Berry Hancock MD/Carlee Jackson, [facility]  Internal Medicine

## 2020-08-31 NOTE — PROGRESS NOTES
SUBJECTIVE:   CC: Germán Moreira is an 35 year old male who presents for preventative health visit.     Healthy Habits:     Getting at least 3 servings of Calcium per day:  Yes    Bi-annual eye exam:  NO    Dental care twice a year:  Yes    Sleep apnea or symptoms of sleep apnea:  None    Diet:  Regular (no restrictions)    Frequency of exercise:  1 day/week    Duration of exercise:  15-30 minutes    Taking medications regularly:  Yes    Medication side effects:  Not applicable    PHQ-2 Total Score: 0    Additional concerns today:  No          Today's PHQ-2 Score:   PHQ-2 ( 1999 [employer]) 8/30/2020   Q1: Little interest or pleasure in doing things 0   Q2: Feeling down, depressed or hopeless 0   PHQ-2 Score 0   Q1: Little interest or pleasure in doing things Not at all   Q2: Feeling down, depressed or hopeless Not at all   PHQ-2 Score 0       Abuse: Current or Past(Physical, Sexual or Emotional)- No  Do you feel safe in your environment? Yes        Social History     Tobacco Use     Smoking status: Never Smoker     Smokeless tobacco: Never Used   Substance Use Topics     Alcohol use: Yes     Comment: casu     If you drink alcohol do you typically have >3 drinks per day or >7 drinks per week? No    Alcohol Use 8/30/2020   Prescreen: >3 drinks/day or >7 drinks/week? No   Prescreen: >3 drinks/day or >7 drinks/week? -       Last PSA: No results found for: PSA    Reviewed orders with patient. Reviewed health maintenance and updated orders accordingly - Yes  Labs reviewed in EPIC    Reviewed and updated as needed this visit by clinical staff  Tobacco  Allergies  Meds         Reviewed and updated as needed this visit by Provider            Review of Systems  CONSTITUTIONAL: NEGATIVE for fever, chills. Weight up 7 pounds in 1 year  INTEGUMENTARY/SKIN: NEGATIVE for worrisome rashes, moles or lesions  EYES: NEGATIVE for vision changes or irritation. Due for eye exam  ENT: NEGATIVE for ear, mouth and throat problems  RESP:  NEGATIVE for significant cough or SOB  CV: NEGATIVE for chest pain, palpitations or peripheral edema  GI: NEGATIVE for nausea, abdominal pain, heartburn, or change in bowel habits   male: negative for dysuria, hematuria, decreased urinary stream, erectile dysfunction, urethral discharge  MUSCULOSKELETAL: NEGATIVE for significant arthralgias or myalgia  NEURO: NEGATIVE for weakness, dizziness or paresthesias  PSYCHIATRIC: NEGATIVE for changes in mood or affect    OBJECTIVE:   /70   Pulse 63   Temp 97  F (36.1  C) (Temporal)   Resp 16   Ht 1.829 m (6')   Wt 82.6 kg (182 lb)   SpO2 97%   BMI 24.68 kg/m      Physical Exam  General appearance - healthy, alert, no distress  Skin - No rashes or lesions. Few small benign appearing skin tags on trunk  Head - normocephalic, atraumatic  Eyes - MALISSA, EOMI, fundi exam with nondilated pupils negative.  Ears - External ears normal. Canals clear. TM's normal.  Nose/Sinuses - Nares normal. Septum midline. Mucosa normal. No drainage or sinus tenderness.  Oropharynx - No erythema, no adenopathy, no exudates.  Neck - Supple without adenopathy or thyromegaly. No bruits.  Lungs - Clear to auscultation without wheezes/rhonchi.  Heart - Regular rate and rhythm without murmurs, clicks, or gallops.  Nodes - No supraclavicular, axillary, or inguinal adenopathy palpable.  Abdomen - Abdomen soft, non-tender. BS normal. No masses or hepatosplenomegaly palpable. No bruits.  Extremities -No cyanosis, clubbing or edema.    Musculoskeletal - Spine ROM normal. Muscular strength intact.   Peripheral pulses - radial=4/4, femoral=4/4, posterior tibial=4/4, dorsalis pedis=4/4,  Neuro - Gait normal. Reflexes normal and symmetric. Sensation grossly WNL.  Genital - Normal-appearing male external genitalia. No scrotal masses or inguinal hernia palpable.   Rectal - Guaic negative stool. Normal tone. Prostate normal in size to palpation. No rectal masses or prostate nodularity  palpable      ASSESSMENT/PLAN:   1. Encounter for routine adult health examination without abnormal findings  Screening labs as ordered. Flu shot in the Fall. HCM otherwise UTD  - Comprehensive metabolic panel  - Lipid panel reflex to direct LDL Fasting  - CBC with platelets    2. Allergy to seafood  States had some seafood once since episode with minimal sx and resolved with Benadryl. Will still have EPipen available if more severe reaction  - EPINEPHrine (ANY BX GENERIC EQUIV) 0.3 MG/0.3ML injection 2-pack; Inject 0.3 mLs (0.3 mg) into the muscle once as needed for anaphylaxis  Dispense: 1 each; Refill: 11      COUNSELING:   Reviewed preventive health counseling, as reflected in patient instructions    Estimated body mass index is 24.68 kg/m  as calculated from the following:    Height as of this encounter: 1.829 m (6').    Weight as of this encounter: 82.6 kg (182 lb).         He reports that he has never smoked. He has never used smokeless tobacco.      Counseling Resources:  ATP IV Guidelines  Pooled Cohorts Equation Calculator  FRAX Risk Assessment  ICSI Preventive Guidelines  Dietary Guidelines for Americans, 2010  USDA's MyPlate  ASA Prophylaxis  Lung CA Screening      PLAN:  Continue current meds  Prescriptions refilled.    Labs today as ordered  I would recommend you receive an influenza (flu) vaccine  this Fall (September or October)    Berry Hancock MD  [facility]

## 2020-08-31 NOTE — TELEPHONE ENCOUNTER
Biometric Form Faxed: [phone], copy scanned into chart and original mailed to patient.  The patient has been notified.

## 2021-01-19 ENCOUNTER — TRANSFERRED RECORDS (OUTPATIENT)
Dept: HEALTH INFORMATION MANAGEMENT | Facility: CLINIC | Age: 36
End: 2021-01-19

## 2021-02-22 ENCOUNTER — OFFICE VISIT (OUTPATIENT)
Dept: INTERNAL MEDICINE | Facility: CLINIC | Age: 36
End: 2021-02-22
Payer: COMMERCIAL

## 2021-02-22 VITALS
TEMPERATURE: 97.3 F | DIASTOLIC BLOOD PRESSURE: 78 MMHG | HEART RATE: 88 BPM | SYSTOLIC BLOOD PRESSURE: 124 MMHG | RESPIRATION RATE: 16 BRPM | WEIGHT: 178 LBS | HEIGHT: 72 IN | BODY MASS INDEX: 24.11 KG/M2 | OXYGEN SATURATION: 100 %

## 2021-02-22 DIAGNOSIS — L98.9 SKIN DISORDER: Primary | ICD-10-CM

## 2021-02-22 LAB
ALBUMIN SERPL-MCNC: 4.1 G/DL (ref 3.4–5)
ALP SERPL-CCNC: 73 U/L (ref 40–150)
ALT SERPL W P-5'-P-CCNC: 29 U/L (ref 0–70)
ANION GAP SERPL CALCULATED.3IONS-SCNC: 3 MMOL/L (ref 3–14)
AST SERPL W P-5'-P-CCNC: 17 U/L (ref 0–45)
BILIRUB SERPL-MCNC: 1.2 MG/DL (ref 0.2–1.3)
BUN SERPL-MCNC: 19 MG/DL (ref 7–30)
CALCIUM SERPL-MCNC: 9.2 MG/DL (ref 8.5–10.1)
CHLORIDE SERPL-SCNC: 104 MMOL/L (ref 94–109)
CO2 SERPL-SCNC: 30 MMOL/L (ref 20–32)
CREAT SERPL-MCNC: 0.94 MG/DL (ref 0.66–1.25)
CRP SERPL-MCNC: <2.9 MG/L (ref 0–8)
D DIMER PPP FEU-MCNC: <0.3 UG/ML FEU (ref 0–0.5)
ERYTHROCYTE [DISTWIDTH] IN BLOOD BY AUTOMATED COUNT: 12.7 % (ref 10–15)
ERYTHROCYTE [SEDIMENTATION RATE] IN BLOOD BY WESTERGREN METHOD: 7 MM/H (ref 0–15)
GFR SERPL CREATININE-BSD FRML MDRD: >90 ML/MIN/{1.73_M2}
GLUCOSE SERPL-MCNC: 85 MG/DL (ref 70–99)
HCT VFR BLD AUTO: 44 % (ref 40–53)
HGB BLD-MCNC: 14.8 G/DL (ref 13.3–17.7)
MCH RBC QN AUTO: 30.4 PG (ref 26.5–33)
MCHC RBC AUTO-ENTMCNC: 33.6 G/DL (ref 31.5–36.5)
MCV RBC AUTO: 90 FL (ref 78–100)
PLATELET # BLD AUTO: 237 10E9/L (ref 150–450)
POTASSIUM SERPL-SCNC: 3.6 MMOL/L (ref 3.4–5.3)
PROT SERPL-MCNC: 8 G/DL (ref 6.8–8.8)
RBC # BLD AUTO: 4.87 10E12/L (ref 4.4–5.9)
SODIUM SERPL-SCNC: 137 MMOL/L (ref 133–144)
TSH SERPL DL<=0.005 MIU/L-ACNC: 0.87 MU/L (ref 0.4–4)
WBC # BLD AUTO: 6.7 10E9/L (ref 4–11)

## 2021-02-22 PROCEDURE — 86769 SARS-COV-2 COVID-19 ANTIBODY: CPT | Performed by: INTERNAL MEDICINE

## 2021-02-22 PROCEDURE — 82595 ASSAY OF CRYOGLOBULIN: CPT | Performed by: INTERNAL MEDICINE

## 2021-02-22 PROCEDURE — 36415 COLL VENOUS BLD VENIPUNCTURE: CPT | Performed by: INTERNAL MEDICINE

## 2021-02-22 PROCEDURE — 87040 BLOOD CULTURE FOR BACTERIA: CPT | Performed by: INTERNAL MEDICINE

## 2021-02-22 PROCEDURE — 85652 RBC SED RATE AUTOMATED: CPT | Performed by: INTERNAL MEDICINE

## 2021-02-22 PROCEDURE — 80053 COMPREHEN METABOLIC PANEL: CPT | Performed by: INTERNAL MEDICINE

## 2021-02-22 PROCEDURE — 85379 FIBRIN DEGRADATION QUANT: CPT | Performed by: INTERNAL MEDICINE

## 2021-02-22 PROCEDURE — 86147 CARDIOLIPIN ANTIBODY EA IG: CPT | Performed by: INTERNAL MEDICINE

## 2021-02-22 PROCEDURE — 85027 COMPLETE CBC AUTOMATED: CPT | Performed by: INTERNAL MEDICINE

## 2021-02-22 PROCEDURE — 86140 C-REACTIVE PROTEIN: CPT | Performed by: INTERNAL MEDICINE

## 2021-02-22 PROCEDURE — 85613 RUSSELL VIPER VENOM DILUTED: CPT | Performed by: INTERNAL MEDICINE

## 2021-02-22 PROCEDURE — 85730 THROMBOPLASTIN TIME PARTIAL: CPT | Performed by: INTERNAL MEDICINE

## 2021-02-22 PROCEDURE — 86038 ANTINUCLEAR ANTIBODIES: CPT | Performed by: INTERNAL MEDICINE

## 2021-02-22 PROCEDURE — 84443 ASSAY THYROID STIM HORMONE: CPT | Performed by: INTERNAL MEDICINE

## 2021-02-22 PROCEDURE — 99214 OFFICE O/P EST MOD 30 MIN: CPT | Performed by: INTERNAL MEDICINE

## 2021-02-22 ASSESSMENT — MIFFLIN-ST. JEOR: SCORE: 1775.4

## 2021-02-22 NOTE — PROGRESS NOTES
Assessment & Plan      ASSESSMENT:   1. Skin disorder  Concern for possible Raynaud's disease versus Covid toes versus other connective tissue disease.  Patient not having fevers and no murmur heard to suggest endocarditis picture does not clinically look like that since he is not feeling sick otherwise we will also check blood cultures.  If labs all come back normal, will check echo of the heart.  Patient counseled to discontinue caffeine for now and to be sure he is wearing warm socks to help improve circulation of the feet  - CRP inflammation  - Erythrocyte sedimentation rate auto  - Anti Nuclear Elke IgG by IFA with Reflex  - Cardiolipin Elke IgG and IgM  - Lupus Anticoagulant Panel  - Cryoglobulin quantitative  - COVID-19 Virus (Coronavirus) Antibody & Titer Reflex; Future  - Blood culture  - TSH with free T4 reflex  - D dimer, quantitative  - CBC with platelets  - COVID-19 Virus (Coronavirus) Antibody & Titer Reflex  - Comprehensive metabolic panel      PLAN:  Labs as ordered  Avoid caffeine for now   Wear good warm socks throughout the day to increase circulation to the feet  Possible future echo of the heart      (Chart documentation was completed, in part, with [employer] voice-recognition software. Even though reviewed, some grammatical, spelling, and word errors may remain.)    Berry Hancock MD  Internal Medicine Department  [facility]      Ebony Salinas is a 36 year old who presents for the following health issues  accompanied by himself:    HPI   Chief Complaint   Patient presents with     Derm Problem     Patient c /o Red Painful Blisters on toes,  1 month         Patient complains of purplish spots on his left toes and one on his right foot toe. Have been present for about a month.  We will start out as a slight painful blister or sometimes just get a purplish spot.  Patient was seen by Dr Dillard at [facility] few weeks ago.  Patient was diagnosed with a  subcorneal hemorrhage on the toe secondary to callus trauma by derm and no other treatment was done at that time per note in chart.  Plan was to follow-up in 2 months.  Since that time however patient has developed purplish spots on other toes of the left foot.  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  Has a couple cups of coffee per day.  Gets occasional cold feeling in his hands or feet.  No pain in his legs with ambulation.  Patient denies palpitations, chest pain, shortness of breath.  Is not aware of any previous Covid infection.  Denies general bleeding/bruising issues.   Nonsmoker. Hx normal lipids. No F/C. Monogamous with wife. No IVDA       Additional ROS:   Constitutional, HEENT, Cardiovascular, Pulmonary, GI and , Neuro, MSK and Psych review of systems/symptoms are otherwise negative or unchanged from previous, except as noted above.      OBJECTIVE:  /78   Pulse 88   Temp 97.3  F (36.3  C) (Temporal)   Resp 16   Ht 1.829 m (6')   Wt 80.7 kg (178 lb)   SpO2 100%   BMI 24.14 kg/m     Estimated body mass index is 24.14 kg/m  as calculated from the following:    Height as of this encounter: 1.829 m (6').    Weight as of this encounter: 80.7 kg (178 lb).     Neck: no adenopathy. Thyroid normal to palpation. No bruits  Pulm: Lungs clear to auscultation   CV: Regular rates and rhythm.  No murmur appreciated on auscultation  GI: Soft, nontender, Normal active bowel sounds, No hepatosplenomegaly or masses palpable  Ext: Peripheral pulses intact. No edema.  Minimal coolness to palpation of the feet and hands compared to more proximal parts of the extremities.  Purplish violaceous spots approximately 6 mm in diameter present at the distal left first, second, third and fourth toes.  2 mm purplish spot also seen on the right second toe.  Tip of the left second toe has slight rough callused tissue  Neuro: Normal strength and tone, sensory exam grossly  normal

## 2021-02-22 NOTE — PATIENT INSTRUCTIONS
Labs as ordered  Avoid caffeine for now and  Wear good warm socks throughout the day to increase circulation to the feet  Possible future echo of the heart

## 2021-02-23 LAB
ANA SER QL IF: NEGATIVE
CARDIOLIPIN ANTIBODY IGG: <1.6 GPL-U/ML (ref 0–19.9)
CARDIOLIPIN ANTIBODY IGM: 0.3 MPL-U/ML (ref 0–19.9)
SARS-COV-2 AB PNL SERPL IA: NEGATIVE
SARS-COV-2 IGG SERPL IA-ACNC: NORMAL

## 2021-02-24 LAB — LA PPP-IMP: NEGATIVE

## 2021-02-28 LAB
BACTERIA SPEC CULT: NO GROWTH
SPECIMEN SOURCE: NORMAL

## 2021-03-01 LAB — CRYOGLOB SER QL: ABNORMAL %

## 2021-03-03 ENCOUNTER — MYC MEDICAL ADVICE (OUTPATIENT)
Dept: INTERNAL MEDICINE | Facility: CLINIC | Age: 36
End: 2021-03-03

## 2021-03-03 DIAGNOSIS — D89.1: Primary | ICD-10-CM

## 2021-03-08 ENCOUNTER — MYC MEDICAL ADVICE (OUTPATIENT)
Dept: INTERNAL MEDICINE | Facility: CLINIC | Age: 36
End: 2021-03-08

## 2021-03-08 DIAGNOSIS — D89.1: Primary | ICD-10-CM

## 2021-03-08 PROCEDURE — 36415 COLL VENOUS BLD VENIPUNCTURE: CPT | Performed by: INTERNAL MEDICINE

## 2021-03-08 PROCEDURE — 86431 RHEUMATOID FACTOR QUANT: CPT | Performed by: INTERNAL MEDICINE

## 2021-03-08 PROCEDURE — 82784 ASSAY IGA/IGD/IGG/IGM EACH: CPT | Performed by: INTERNAL MEDICINE

## 2021-03-08 PROCEDURE — 87389 HIV-1 AG W/HIV-1&-2 AB AG IA: CPT | Performed by: INTERNAL MEDICINE

## 2021-03-08 PROCEDURE — 86160 COMPLEMENT ANTIGEN: CPT | Performed by: INTERNAL MEDICINE

## 2021-03-08 PROCEDURE — 87340 HEPATITIS B SURFACE AG IA: CPT | Performed by: INTERNAL MEDICINE

## 2021-03-08 PROCEDURE — 84165 PROTEIN E-PHORESIS SERUM: CPT | Performed by: PATHOLOGY

## 2021-03-08 PROCEDURE — 82595 ASSAY OF CRYOGLOBULIN: CPT | Performed by: INTERNAL MEDICINE

## 2021-03-08 PROCEDURE — 86334 IMMUNOFIX E-PHORESIS SERUM: CPT | Performed by: INTERNAL MEDICINE

## 2021-03-08 PROCEDURE — 86255 FLUORESCENT ANTIBODY SCREEN: CPT | Performed by: INTERNAL MEDICINE

## 2021-03-08 PROCEDURE — 83090 ASSAY OF HOMOCYSTEINE: CPT | Performed by: INTERNAL MEDICINE

## 2021-03-08 PROCEDURE — 86803 HEPATITIS C AB TEST: CPT | Performed by: INTERNAL MEDICINE

## 2021-03-09 LAB
C3 SERPL-MCNC: 87 MG/DL (ref 81–157)
C4 SERPL-MCNC: 23 MG/DL (ref 13–39)
HBV SURFACE AG SERPL QL IA: NONREACTIVE
HCV AB SERPL QL IA: NONREACTIVE
HIV 1+2 AB+HIV1 P24 AG SERPL QL IA: NONREACTIVE
IGA SERPL-MCNC: 297 MG/DL (ref 84–499)
IGG SERPL-MCNC: 1192 MG/DL (ref 610–1616)
IGM SERPL-MCNC: 83 MG/DL (ref 35–242)
RHEUMATOID FACT SER NEPH-ACNC: <7 IU/ML (ref 0–20)

## 2021-03-10 LAB
ALBUMIN SERPL ELPH-MCNC: 4.9 G/DL (ref 3.7–5.1)
ALPHA1 GLOB SERPL ELPH-MCNC: 0.3 G/DL (ref 0.2–0.4)
ALPHA2 GLOB SERPL ELPH-MCNC: 0.5 G/DL (ref 0.5–0.9)
ANCA AB PATTERN SER IF-IMP: NORMAL
B-GLOBULIN SERPL ELPH-MCNC: 0.8 G/DL (ref 0.6–1)
C-ANCA TITR SER IF: NORMAL {TITER}
GAMMA GLOB SERPL ELPH-MCNC: 1.2 G/DL (ref 0.7–1.6)
M PROTEIN SERPL ELPH-MCNC: 0 G/DL
PROT PATTERN SERPL ELPH-IMP: NORMAL

## 2021-03-10 NOTE — TELEPHONE ENCOUNTER
PCP please see [employer] message with update. And when pt is scheduled to see uem.    Radha VILLALBAN, RN, PHN

## 2021-03-10 NOTE — TELEPHONE ENCOUNTER
" Spoke with Felicitas from triage. She will print  Copy of  clinic note from 2/22/21 along with loabs from 2/22/21 and 3/8 21 and call [facility] at [phone] to get their fax number and fax the labs and note to them today  \"attention Dr Cao  re: consultation appt 3/11/21\"  "

## 2021-03-11 ENCOUNTER — TRANSFERRED RECORDS (OUTPATIENT)
Dept: HEALTH INFORMATION MANAGEMENT | Facility: CLINIC | Age: 36
End: 2021-03-11

## 2021-03-11 LAB — HCYS SERPL-SCNC: 8.2 UMOL/L (ref 4–12)

## 2021-03-15 LAB
CRYOGLOB IGA & IGG & IGM SER-IMP: NORMAL
CRYOGLOB SER QL: NEGATIVE %

## 2021-03-16 ENCOUNTER — TRANSFERRED RECORDS (OUTPATIENT)
Dept: HEALTH INFORMATION MANAGEMENT | Facility: CLINIC | Age: 36
End: 2021-03-16

## 2021-06-21 NOTE — PATIENT INSTRUCTIONS
Before Your Surgery      Call your surgeon if there is any change in your health. This includes signs of a cold or flu (such as a sore throat, runny nose, cough, rash or fever).    Do not smoke, drink alcohol or take over the counter medicine (unless your surgeon or primary care doctor tells you to) for the 24 hours before and after surgery.    If you take prescribed drugs: Follow your doctor s orders about which medicines to take and which to stop until after surgery.    Eating and drinking prior to surgery: follow the instructions from your surgeon    Take a shower or bath the night before surgery. Use the soap your surgeon gave you to gently clean your skin. If you do not have soap from your surgeon, use your regular soap. Do not shave or scrub the surgery site.  Wear clean pajamas and have clean sheets on your bed.   
[id number]: || ||00\01||False;

## 2021-09-04 ENCOUNTER — HEALTH MAINTENANCE LETTER (OUTPATIENT)
Age: 36
End: 2021-09-04

## 2021-10-29 ENCOUNTER — OFFICE VISIT (OUTPATIENT)
Dept: INTERNAL MEDICINE | Facility: CLINIC | Age: 36
End: 2021-10-29
Payer: COMMERCIAL

## 2021-10-29 VITALS
BODY MASS INDEX: 23.98 KG/M2 | SYSTOLIC BLOOD PRESSURE: 122 MMHG | TEMPERATURE: 97.7 F | DIASTOLIC BLOOD PRESSURE: 78 MMHG | OXYGEN SATURATION: 97 % | WEIGHT: 177 LBS | HEIGHT: 72 IN | HEART RATE: 62 BPM | RESPIRATION RATE: 16 BRPM

## 2021-10-29 DIAGNOSIS — Z00.00 ENCOUNTER FOR ROUTINE ADULT HEALTH EXAMINATION WITHOUT ABNORMAL FINDINGS: ICD-10-CM

## 2021-10-29 DIAGNOSIS — Z23 NEED FOR INFLUENZA VACCINATION: ICD-10-CM

## 2021-10-29 DIAGNOSIS — Z91.013 ALLERGY TO SEAFOOD: ICD-10-CM

## 2021-10-29 PROCEDURE — 99395 PREV VISIT EST AGE 18-39: CPT | Mod: 25 | Performed by: INTERNAL MEDICINE

## 2021-10-29 PROCEDURE — 90471 IMMUNIZATION ADMIN: CPT | Performed by: INTERNAL MEDICINE

## 2021-10-29 PROCEDURE — 90686 IIV4 VACC NO PRSV 0.5 ML IM: CPT | Performed by: INTERNAL MEDICINE

## 2021-10-29 RX ORDER — EPINEPHRINE 0.3 MG/.3ML
0.3 INJECTION SUBCUTANEOUS
Qty: 1 EACH | Refills: 11 | Status: SHIPPED | OUTPATIENT
Start: 2021-10-29 | End: 2022-08-12

## 2021-10-29 ASSESSMENT — ENCOUNTER SYMPTOMS
HEMATURIA: 0
HEADACHES: 0
EYE PAIN: 0
DIZZINESS: 0
ABDOMINAL PAIN: 0
SORE THROAT: 0
NAUSEA: 0
PARESTHESIAS: 0
FREQUENCY: 0
NERVOUS/ANXIOUS: 0
MYALGIAS: 0
CHILLS: 0
FEVER: 0
SHORTNESS OF BREATH: 0
HEARTBURN: 0
WEAKNESS: 0
ARTHRALGIAS: 0
CONSTIPATION: 0
PALPITATIONS: 0
HEMATOCHEZIA: 0
JOINT SWELLING: 0
COUGH: 0
DIARRHEA: 0
DYSURIA: 0

## 2021-10-29 ASSESSMENT — MIFFLIN-ST. JEOR: SCORE: 1770.87

## 2021-10-29 NOTE — PATIENT INSTRUCTIONS
Continue current meds  Prescriptions refilled on file.  Fasting labs in the next couple weeks. May do at [facility]. Call for appt    For fasting labs, please refrain from eating for 8 hours or more.   Drink 2 glasses of water before your lab appointment. It is fine to take your  oral medications on the morning of the lab test as usual  Fax Biometric form to us at [phone] attention Dr Hancock after labs done  Flu vaccine  Schedule an eye appointment

## 2021-10-29 NOTE — PROGRESS NOTES
SUBJECTIVE:   CC: Germán Moreira is an 36 year old male who presents for preventative health visit.       Patient has been advised of split billing requirements and indicates understanding: Yes  Healthy Habits:     Getting at least 3 servings of Calcium per day:  Yes    Bi-annual eye exam:  NO    Dental care twice a year:  Yes    Sleep apnea or symptoms of sleep apnea:  None    Diet:  Regular (no restrictions)    Frequency of exercise:  None    Taking medications regularly:  Yes    Medication side effects:  Not applicable    PHQ-2 Total Score: 0    Additional concerns today:  No              Today's PHQ-2 Score:   PHQ-2 ( 1999 Pfizer) 10/29/2021   Q1: Little interest or pleasure in doing things 0   Q2: Feeling down, depressed or hopeless 0   PHQ-2 Score 0   Q1: Little interest or pleasure in doing things Not at all   Q2: Feeling down, depressed or hopeless Not at all   PHQ-2 Score 0       Abuse: Current or Past(Physical, Sexual or Emotional)- No  Do you feel safe in your environment? Yes      Social History     Tobacco Use     Smoking status: Never Smoker     Smokeless tobacco: Never Used   Substance Use Topics     Alcohol use: Yes     Comment: casu     If you drink alcohol do you typically have >3 drinks per day or >7 drinks per week? No    Alcohol Use 10/29/2021   Prescreen: >3 drinks/day or >7 drinks/week? No   Prescreen: >3 drinks/day or >7 drinks/week? -       Last PSA: No results found for: PSA    Reviewed orders with patient. Reviewed health maintenance and updated orders accordingly - Yes  Labs reviewed in EPIC    Reviewed and updated as needed this visit by clinical staff                 Reviewed and updated as needed this visit by Provider                    Review of Systems   Constitutional: Negative for chills and fever.   HENT: Negative for congestion, ear pain, hearing loss and sore throat.    Eyes: Negative for pain and visual disturbance.   Respiratory: Negative for cough and shortness of breath.     Cardiovascular: Negative for chest pain, palpitations and peripheral edema.   Gastrointestinal: Negative for abdominal pain, constipation, diarrhea, heartburn, hematochezia and nausea.   Genitourinary: Negative for discharge, dysuria, frequency, genital sores, hematuria, impotence and urgency.   Musculoskeletal: Negative for arthralgias, joint swelling and myalgias.   Skin: Negative for rash.   Neurological: Negative for dizziness, weakness, headaches and paresthesias.   Psychiatric/Behavioral: Negative for mood changes. The patient is not nervous/anxious.       Weight down 4 pounds    OBJECTIVE:   /78   Pulse 62   Temp 97.7  F (36.5  C) (Temporal)   Resp 16   Ht 1.829 m (6')   Wt 80.3 kg (177 lb)   SpO2 97%   BMI 24.01 kg/m      Physical Exam  General appearance - healthy, alert, no distress  Skin - No rashes or lesions.  Head - normocephalic, atraumatic  Eyes - MALISSA, EOMI, fundi exam with nondilated pupils negative.  Ears - External ears normal. Canals clear. TM's normal.  Nose/Sinuses - Nares normal. Septum midline. Mucosa normal. No drainage or sinus tenderness.  Oropharynx - No erythema, no adenopathy, no exudates.  Neck - Supple without adenopathy or thyromegaly. No bruits.  Lungs - Clear to auscultation without wheezes/rhonchi.  Heart - Regular rate and rhythm without murmurs, clicks, or gallops.  Nodes - No supraclavicular, axillary, or inguinal adenopathy palpable.  Abdomen - Abdomen soft, non-tender. BS normal. No masses or hepatosplenomegaly palpable. No bruits.  Extremities -No cyanosis, clubbing or edema.    Musculoskeletal - Spine ROM normal. Muscular strength intact.   Peripheral pulses - radial=4/4, femoral=4/4, posterior tibial=4/4, dorsalis pedis=4/4,  Neuro - Gait normal. Reflexes normal and symmetric. Sensation grossly WNL.  Genital - Normal-appearing male external genitalia. No scrotal masses or inguinal hernia palpable.   Rectal - deferred for age. External rectal area normal in  appearance          ASSESSMENT/PLAN:   1. Encounter for routine adult health examination without abnormal findings  Screening labs as ordered.  Other lab screenings up-to-date.  Flu vaccine today.  Due for eye exam.  Other vaccinations and other healthcare maintenance up-to-date.  Based on age, job and medical history, Covid booster vaccination not required at this time  - Lipid panel reflex to direct LDL Fasting; Future  - Glucose; Future    2. 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 Need for influenza vaccination  - HC FLU VAC PRESRV FREE QUAD SPLIT VIR > 6 MONTHS IM ([id number])      Patient has been advised of split billing requirements and indicates understanding: Yes but NA as refill of EPI pen could have been addressed without appt    COUNSELING:   Reviewed preventive health counseling, as reflected in patient instructions    Estimated body mass index is 24.01 kg/m  as calculated from the following:    Height as of this encounter: 1.829 m (6').    Weight as of this encounter: 80.3 kg (177 lb).         He reports that he has never smoked. He has never used smokeless tobacco.      Counseling Resources:  ATP IV Guidelines  Pooled Cohorts Equation Calculator  FRAX Risk Assessment  ICSI Preventive Guidelines  Dietary Guidelines for Americans, 2010  USDA's MyPlate  ASA Prophylaxis  Lung CA Screening      PLAN:  Continue current meds  Prescriptions refilled on file.  Fasting labs in the next couple weeks. May do at [facility]. Call for appt    For fasting labs, please refrain from eating for 8 hours or more.   Drink 2 glasses of water before your lab appointment. It is fine to take your  oral medications on the morning of the lab test as usual  Fax  Biometric form to us at [phone] attention Dr Hancock after labs done  Flu vaccine  Schedule an eye appointment      Berry Hancock MD  [facility]

## 2021-10-30 ENCOUNTER — HEALTH MAINTENANCE LETTER (OUTPATIENT)
Age: 36
End: 2021-10-30

## 2021-11-08 ENCOUNTER — MYC MEDICAL ADVICE (OUTPATIENT)
Dept: INTERNAL MEDICINE | Facility: CLINIC | Age: 36
End: 2021-11-08
Payer: COMMERCIAL

## 2021-11-08 DIAGNOSIS — Z83.79 FAMILY HISTORY OF CELIAC DISEASE: Primary | ICD-10-CM

## 2021-11-09 NOTE — TELEPHONE ENCOUNTER
Dr Hancock,     Please see my chart note.     Attempted to pend labs but was unsure which one.      Helene Medeiros RN

## 2021-11-15 ENCOUNTER — LAB (OUTPATIENT)
Dept: LAB | Facility: CLINIC | Age: 36
End: 2021-11-15
Payer: COMMERCIAL

## 2021-11-15 DIAGNOSIS — Z83.79 FAMILY HISTORY OF CELIAC DISEASE: ICD-10-CM

## 2021-11-15 DIAGNOSIS — Z00.00 ENCOUNTER FOR ROUTINE ADULT HEALTH EXAMINATION WITHOUT ABNORMAL FINDINGS: ICD-10-CM

## 2021-11-15 LAB
CHOLEST SERPL-MCNC: 165 MG/DL
FASTING STATUS PATIENT QL REPORTED: YES
FASTING STATUS PATIENT QL REPORTED: YES
GLUCOSE BLD-MCNC: 92 MG/DL (ref 70–99)
HDLC SERPL-MCNC: 51 MG/DL
LDLC SERPL CALC-MCNC: 102 MG/DL
NONHDLC SERPL-MCNC: 114 MG/DL
TRIGL SERPL-MCNC: 62 MG/DL

## 2021-11-15 PROCEDURE — 82784 ASSAY IGA/IGD/IGG/IGM EACH: CPT

## 2021-11-15 PROCEDURE — 36415 COLL VENOUS BLD VENIPUNCTURE: CPT

## 2021-11-15 PROCEDURE — 80061 LIPID PANEL: CPT

## 2021-11-15 PROCEDURE — 82947 ASSAY GLUCOSE BLOOD QUANT: CPT

## 2021-11-15 PROCEDURE — 83516 IMMUNOASSAY NONANTIBODY: CPT | Mod: 59

## 2021-11-16 LAB
IGA SERPL-MCNC: 286 MG/DL (ref 84–499)
TTG IGA SER-ACNC: 0.7 U/ML
TTG IGG SER-ACNC: <0.6 U/ML

## 2022-08-09 ASSESSMENT — ENCOUNTER SYMPTOMS
NERVOUS/ANXIOUS: 0
SORE THROAT: 0
CHILLS: 0
WEAKNESS: 0
SHORTNESS OF BREATH: 0
JOINT SWELLING: 0
HEADACHES: 0
FEVER: 0
HEARTBURN: 0
HEMATURIA: 0
NAUSEA: 0
MYALGIAS: 0
PARESTHESIAS: 0
DIARRHEA: 0
COUGH: 0
CONSTIPATION: 0
ABDOMINAL PAIN: 0
HEMATOCHEZIA: 0
PALPITATIONS: 0
FREQUENCY: 0
DYSURIA: 0
ARTHRALGIAS: 0
EYE PAIN: 0
DIZZINESS: 0

## 2022-08-12 ENCOUNTER — OFFICE VISIT (OUTPATIENT)
Dept: INTERNAL MEDICINE | Facility: CLINIC | Age: 37
End: 2022-08-12
Payer: COMMERCIAL

## 2022-08-12 VITALS
HEART RATE: 57 BPM | SYSTOLIC BLOOD PRESSURE: 122 MMHG | WEIGHT: 172 LBS | DIASTOLIC BLOOD PRESSURE: 68 MMHG | BODY MASS INDEX: 23.3 KG/M2 | TEMPERATURE: 97.8 F | HEIGHT: 72 IN | OXYGEN SATURATION: 100 %

## 2022-08-12 DIAGNOSIS — Z00.00 ENCOUNTER FOR ROUTINE ADULT HEALTH EXAMINATION WITHOUT ABNORMAL FINDINGS: Primary | ICD-10-CM

## 2022-08-12 LAB
ALBUMIN SERPL-MCNC: 4.1 G/DL (ref 3.4–5)
ALP SERPL-CCNC: 64 U/L (ref 40–150)
ALT SERPL W P-5'-P-CCNC: 48 U/L (ref 0–70)
ANION GAP SERPL CALCULATED.3IONS-SCNC: 4 MMOL/L (ref 3–14)
AST SERPL W P-5'-P-CCNC: 26 U/L (ref 0–45)
BILIRUB SERPL-MCNC: 0.9 MG/DL (ref 0.2–1.3)
BUN SERPL-MCNC: 20 MG/DL (ref 7–30)
CALCIUM SERPL-MCNC: 9.1 MG/DL (ref 8.5–10.1)
CHLORIDE BLD-SCNC: 105 MMOL/L (ref 94–109)
CHOLEST SERPL-MCNC: 186 MG/DL
CO2 SERPL-SCNC: 29 MMOL/L (ref 20–32)
CREAT SERPL-MCNC: 0.87 MG/DL (ref 0.66–1.25)
ERYTHROCYTE [DISTWIDTH] IN BLOOD BY AUTOMATED COUNT: 12.2 % (ref 10–15)
FASTING STATUS PATIENT QL REPORTED: YES
GFR SERPL CREATININE-BSD FRML MDRD: >90 ML/MIN/1.73M2
GLUCOSE BLD-MCNC: 88 MG/DL (ref 70–99)
HCT VFR BLD AUTO: 45.4 % (ref 40–53)
HDLC SERPL-MCNC: 45 MG/DL
HGB BLD-MCNC: 15.2 G/DL (ref 13.3–17.7)
LDLC SERPL CALC-MCNC: 125 MG/DL
MCH RBC QN AUTO: 30.4 PG (ref 26.5–33)
MCHC RBC AUTO-ENTMCNC: 33.5 G/DL (ref 31.5–36.5)
MCV RBC AUTO: 91 FL (ref 78–100)
NONHDLC SERPL-MCNC: 141 MG/DL
PLATELET # BLD AUTO: 225 10E3/UL (ref 150–450)
POTASSIUM BLD-SCNC: 4.1 MMOL/L (ref 3.4–5.3)
PROT SERPL-MCNC: 7.4 G/DL (ref 6.8–8.8)
RBC # BLD AUTO: 5 10E6/UL (ref 4.4–5.9)
SODIUM SERPL-SCNC: 138 MMOL/L (ref 133–144)
TRIGL SERPL-MCNC: 80 MG/DL
WBC # BLD AUTO: 6.7 10E3/UL (ref 4–11)

## 2022-08-12 PROCEDURE — 36415 COLL VENOUS BLD VENIPUNCTURE: CPT | Performed by: INTERNAL MEDICINE

## 2022-08-12 PROCEDURE — 99395 PREV VISIT EST AGE 18-39: CPT | Performed by: INTERNAL MEDICINE

## 2022-08-12 PROCEDURE — 80061 LIPID PANEL: CPT | Performed by: INTERNAL MEDICINE

## 2022-08-12 PROCEDURE — 85027 COMPLETE CBC AUTOMATED: CPT | Performed by: INTERNAL MEDICINE

## 2022-08-12 PROCEDURE — 80053 COMPREHEN METABOLIC PANEL: CPT | Performed by: INTERNAL MEDICINE

## 2022-08-12 ASSESSMENT — ENCOUNTER SYMPTOMS
JOINT SWELLING: 0
PARESTHESIAS: 0
MYALGIAS: 0
FEVER: 0
HEADACHES: 0
PALPITATIONS: 0
ABDOMINAL PAIN: 0
ARTHRALGIAS: 0
CONSTIPATION: 0
SORE THROAT: 0
HEMATURIA: 0
DIZZINESS: 0
HEMATOCHEZIA: 0
WEAKNESS: 0
HEARTBURN: 0
COUGH: 0
EYE PAIN: 0
CHILLS: 0
FREQUENCY: 0
NERVOUS/ANXIOUS: 0
DIARRHEA: 0
DYSURIA: 0
NAUSEA: 0
SHORTNESS OF BREATH: 0

## 2022-08-12 NOTE — PATIENT INSTRUCTIONS
Screening labs as ordered  Continue diet and exercise pending lab results  Follow up in 1 year. Earlier as needed  Schedule an eye exam appointment. Please ask the eye clinic to fax us a report of your eye exam to [phone], attention Dr Hancock  I would recommend you receive an influenza (flu) vaccine  this Fall (October)

## 2022-08-12 NOTE — PROGRESS NOTES
SUBJECTIVE:   CC: Germán Moreira is an 37 year old male who presents for preventative health visit.       Patient has been advised of split billing requirements and indicates understanding: Yes  Healthy Habits:     Getting at least 3 servings of Calcium per day:  Yes    Bi-annual eye exam:  NO    Dental care twice a year:  Yes    Sleep apnea or symptoms of sleep apnea:  None    Diet:  Regular (no restrictions)    Frequency of exercise:  1 day/week    Duration of exercise:  15-30 minutes    Taking medications regularly:  Yes    Medication side effects:  None    PHQ-2 Total Score: 0    Additional concerns today:  No              Today's PHQ-2 Score:   PHQ-2 ( 1999 Pfizer) 8/9/2022   Q1: Little interest or pleasure in doing things 0   Q2: Feeling down, depressed or hopeless 0   PHQ-2 Score 0   PHQ-2 Total Score (12-17 Years)- Positive if 3 or more points; Administer PHQ-A if positive -   Q1: Little interest or pleasure in doing things Not at all   Q2: Feeling down, depressed or hopeless Not at all   PHQ-2 Score 0       Abuse: Current or Past(Physical, Sexual or Emotional)- No  Do you feel safe in your environment? Yes        Social History     Tobacco Use     Smoking status: Never Smoker     Smokeless tobacco: Never Used   Substance Use Topics     Alcohol use: Yes     Comment: casu       Alcohol Use 8/9/2022   Prescreen: >3 drinks/day or >7 drinks/week? No   Prescreen: >3 drinks/day or >7 drinks/week? -     Last PSA: No results found for: PSA    Reviewed orders with patient. Reviewed health maintenance and updated orders accordingly - Yes  Labs reviewed in EPIC    Reviewed and updated as needed this visit by clinical staff                    Reviewed and updated as needed this visit by Provider                       Review of Systems   Constitutional: Negative for chills and fever.   HENT: Negative for congestion, ear pain, hearing loss and sore throat.    Eyes: Negative for pain and visual disturbance.    Respiratory: Negative for cough and shortness of breath.    Cardiovascular: Negative for chest pain, palpitations and peripheral edema.   Gastrointestinal: Negative for abdominal pain, constipation, diarrhea, heartburn, hematochezia and nausea.   Genitourinary: Negative for dysuria, frequency, genital sores, hematuria, impotence, penile discharge and urgency.   Musculoskeletal: Negative for arthralgias, joint swelling and myalgias.   Skin: Negative for rash.   Neurological: Negative for dizziness, weakness, headaches and paresthesias.   Psychiatric/Behavioral: Negative for mood changes. The patient is not nervous/anxious.      Weight down 5 pounds  Vaccinations up-to-date for age    OBJECTIVE:   /68   Pulse 57   Temp 97.8  F (36.6  C) (Oral)   Ht 1.829 m (6')   Wt 78 kg (172 lb)   SpO2 100%   BMI 23.33 kg/m      Physical Exam  General appearance - healthy, alert, no distress  Skin - No rashes or lesions.  Head - normocephalic, atraumatic  Eyes - MALISSA, EOMI, fundi exam with nondilated pupils negative.  Ears - External ears normal. Canals clear. TM's normal.  Nose/Sinuses - Nares normal. Septum midline. Mucosa normal. No drainage or sinus tenderness.  Oropharynx - No erythema, no adenopathy, no exudates.  Neck - Supple without adenopathy or thyromegaly. No bruits.  Lungs - Clear to auscultation without wheezes/rhonchi.  Heart - Regular rate and rhythm without murmurs, clicks, or gallops.  Nodes - No supraclavicular, axillary, or inguinal adenopathy palpable.  Abdomen - Abdomen soft, non-tender. BS normal. No masses or hepatosplenomegaly palpable. No bruits.  Extremities -No cyanosis, clubbing or edema.    Musculoskeletal - Spine ROM normal. Muscular strength intact.   Peripheral pulses - radial=4/4, femoral=4/4, posterior tibial=4/4, dorsalis pedis=4/4,  Neuro - Gait normal. Reflexes normal and symmetric. Sensation grossly WNL.  Genital - Normal-appearing male external genitalia. No scrotal masses or  inguinal hernia palpable.   Rectal -external rectal area normal in appearance.  LAWRENCE deferred for age          ASSESSMENT/PLAN:   1. Encounter for routine adult health examination without abnormal findings  Screening labs as ordered.  Other healthcare maintenance up-to-date.   - REVIEW OF HEALTH MAINTENANCE PROTOCOL ORDERS  - Comprehensive metabolic panel; Future  - Lipid panel reflex to direct LDL Fasting; Future  - CBC with platelets; Future      Patient has been advised of split billing requirements and indicates understanding: Yes but NA    COUNSELING:   Reviewed preventive health counseling, as reflected in patient instructions    Estimated body mass index is 24.01 kg/m  as calculated from the following:    Height as of 10/29/21: 1.829 m (6').    Weight as of 10/29/21: 80.3 kg (177 lb).         He reports that he has never smoked. He has never used smokeless tobacco.      Counseling Resources:  ATP IV Guidelines  Pooled Cohorts Equation Calculator  FRAX Risk Assessment  ICSI Preventive Guidelines  Dietary Guidelines for Americans, 2010  USDA's MyPlate  ASA Prophylaxis  Lung CA Screening      PLAN:  Screening labs as ordered  Continue diet and exercise pending lab results  Follow up in 1 year. Earlier as needed  Schedule an eye exam appointment. Please ask the eye clinic to fax us a report of your eye exam to [phone], attention Dr Hancock  I would recommend you receive an influenza (flu) vaccine  this Fall (October)    Berry Hancock MD  [facility]

## 2022-10-09 ENCOUNTER — MYC MEDICAL ADVICE (OUTPATIENT)
Dept: INTERNAL MEDICINE | Facility: CLINIC | Age: 37
End: 2022-10-09

## 2022-10-09 DIAGNOSIS — Z00.00 LABORATORY EXAMINATION ORDERED AS PART OF A ROUTINE GENERAL MEDICAL EXAMINATION: Primary | ICD-10-CM

## 2022-10-22 ENCOUNTER — HEALTH MAINTENANCE LETTER (OUTPATIENT)
Age: 37
End: 2022-10-22

## 2023-08-21 ASSESSMENT — ENCOUNTER SYMPTOMS
WEAKNESS: 0
FEVER: 0
NERVOUS/ANXIOUS: 0
EYE PAIN: 0
CONSTIPATION: 0
PALPITATIONS: 0
FREQUENCY: 0
HEARTBURN: 0
SORE THROAT: 0
SHORTNESS OF BREATH: 0
NAUSEA: 0
CHILLS: 0
DIARRHEA: 0
HEADACHES: 0
MYALGIAS: 0
HEMATURIA: 0
DIZZINESS: 0
HEMATOCHEZIA: 0
ABDOMINAL PAIN: 1
PARESTHESIAS: 0
JOINT SWELLING: 0
ARTHRALGIAS: 0
COUGH: 0
DYSURIA: 0

## 2023-08-22 ENCOUNTER — OFFICE VISIT (OUTPATIENT)
Dept: INTERNAL MEDICINE | Facility: CLINIC | Age: 38
End: 2023-08-22
Payer: COMMERCIAL

## 2023-08-22 VITALS
DIASTOLIC BLOOD PRESSURE: 80 MMHG | SYSTOLIC BLOOD PRESSURE: 138 MMHG | BODY MASS INDEX: 25.37 KG/M2 | HEIGHT: 72 IN | HEART RATE: 60 BPM | WEIGHT: 187.3 LBS | TEMPERATURE: 98.3 F | OXYGEN SATURATION: 100 %

## 2023-08-22 DIAGNOSIS — N45.1 EPIDIDYMITIS, LEFT: ICD-10-CM

## 2023-08-22 DIAGNOSIS — Z00.01 ENCOUNTER FOR ROUTINE ADULT MEDICAL EXAM WITH ABNORMAL FINDINGS: ICD-10-CM

## 2023-08-22 DIAGNOSIS — N52.9 ERECTILE DYSFUNCTION, UNSPECIFIED ERECTILE DYSFUNCTION TYPE: ICD-10-CM

## 2023-08-22 LAB
ALBUMIN SERPL BCG-MCNC: 4.5 G/DL (ref 3.5–5.2)
ALP SERPL-CCNC: 69 U/L (ref 40–129)
ALT SERPL W P-5'-P-CCNC: 23 U/L (ref 0–70)
ANION GAP SERPL CALCULATED.3IONS-SCNC: 10 MMOL/L (ref 7–15)
AST SERPL W P-5'-P-CCNC: 22 U/L (ref 0–45)
BILIRUB SERPL-MCNC: 0.9 MG/DL
BUN SERPL-MCNC: 18.5 MG/DL (ref 6–20)
CALCIUM SERPL-MCNC: 9.1 MG/DL (ref 8.6–10)
CHLORIDE SERPL-SCNC: 103 MMOL/L (ref 98–107)
CHOLEST SERPL-MCNC: 193 MG/DL
CREAT SERPL-MCNC: 0.96 MG/DL (ref 0.67–1.17)
DEPRECATED HCO3 PLAS-SCNC: 27 MMOL/L (ref 22–29)
ERYTHROCYTE [DISTWIDTH] IN BLOOD BY AUTOMATED COUNT: 11.9 % (ref 10–15)
GFR SERPL CREATININE-BSD FRML MDRD: >90 ML/MIN/1.73M2
GLUCOSE SERPL-MCNC: 92 MG/DL (ref 70–99)
HCT VFR BLD AUTO: 42.9 % (ref 40–53)
HDLC SERPL-MCNC: 51 MG/DL
HGB BLD-MCNC: 15 G/DL (ref 13.3–17.7)
LDLC SERPL CALC-MCNC: 131 MG/DL
MCH RBC QN AUTO: 31.4 PG (ref 26.5–33)
MCHC RBC AUTO-ENTMCNC: 35 G/DL (ref 31.5–36.5)
MCV RBC AUTO: 90 FL (ref 78–100)
NONHDLC SERPL-MCNC: 142 MG/DL
PLATELET # BLD AUTO: 227 10E3/UL (ref 150–450)
POTASSIUM SERPL-SCNC: 4 MMOL/L (ref 3.4–5.3)
PROT SERPL-MCNC: 7.2 G/DL (ref 6.4–8.3)
RBC # BLD AUTO: 4.77 10E6/UL (ref 4.4–5.9)
SHBG SERPL-SCNC: 53 NMOL/L (ref 11–80)
SODIUM SERPL-SCNC: 140 MMOL/L (ref 136–145)
TRIGL SERPL-MCNC: 53 MG/DL
WBC # BLD AUTO: 6.8 10E3/UL (ref 4–11)

## 2023-08-22 PROCEDURE — 84403 ASSAY OF TOTAL TESTOSTERONE: CPT | Performed by: INTERNAL MEDICINE

## 2023-08-22 PROCEDURE — 99213 OFFICE O/P EST LOW 20 MIN: CPT | Mod: 25 | Performed by: INTERNAL MEDICINE

## 2023-08-22 PROCEDURE — 85027 COMPLETE CBC AUTOMATED: CPT | Performed by: INTERNAL MEDICINE

## 2023-08-22 PROCEDURE — 99395 PREV VISIT EST AGE 18-39: CPT | Performed by: INTERNAL MEDICINE

## 2023-08-22 PROCEDURE — 36415 COLL VENOUS BLD VENIPUNCTURE: CPT | Performed by: INTERNAL MEDICINE

## 2023-08-22 PROCEDURE — 84270 ASSAY OF SEX HORMONE GLOBUL: CPT | Performed by: INTERNAL MEDICINE

## 2023-08-22 PROCEDURE — 80061 LIPID PANEL: CPT | Performed by: INTERNAL MEDICINE

## 2023-08-22 PROCEDURE — 80053 COMPREHEN METABOLIC PANEL: CPT | Performed by: INTERNAL MEDICINE

## 2023-08-22 RX ORDER — CIPROFLOXACIN 500 MG/1
500 TABLET, FILM COATED ORAL 2 TIMES DAILY
Qty: 14 TABLET | Refills: 0 | Status: SHIPPED | OUTPATIENT
Start: 2023-08-22 | End: 2023-08-29

## 2023-08-22 ASSESSMENT — ENCOUNTER SYMPTOMS
CONSTIPATION: 0
FEVER: 0
HEMATOCHEZIA: 0
HEADACHES: 0
ARTHRALGIAS: 0
HEMATURIA: 0
NAUSEA: 0
NERVOUS/ANXIOUS: 0
MYALGIAS: 0
JOINT SWELLING: 0
DIARRHEA: 0
FREQUENCY: 0
PALPITATIONS: 0
EYE PAIN: 0
PARESTHESIAS: 0
SHORTNESS OF BREATH: 0
COUGH: 0
HEARTBURN: 0
CHILLS: 0
WEAKNESS: 0
SORE THROAT: 0
DIZZINESS: 0
DYSURIA: 0
ABDOMINAL PAIN: 1

## 2023-08-22 NOTE — PATIENT INSTRUCTIONS
Cipro 500mg tab, 1 tab twice a day for 7 days for probable epididymitis infection. Stop if new  acute muscle pain  If left groin issues persists, 3 weeks  then let me know and will get a scrotal  ultrasound  Labs as ordered  Covid  booster  and influenza/flu vaccinations in the  Fall (October).  May get through a pharmacy or at clinic  Send a note in [employer] in future if wish to consider trial of Viagra for erectile function  Pt was informed regarding extra E&M billing for management of new or established medical issues not related to today's wellness/screening visit

## 2023-08-22 NOTE — PROGRESS NOTES
SUBJECTIVE:   CC:  is an 38 year old who presents for preventative health visit and evaluation erectile dysfunction, left groin  discomfort      Healthy Habits:     Getting at least 3 servings of Calcium per day:  Yes    Bi-annual eye exam:  Yes    Dental care twice a year:  Yes    Sleep apnea or symptoms of sleep apnea:  None    Diet:  Regular (no restrictions)    Frequency of exercise:  None    Taking medications regularly:  Yes    Medication side effects:  Not applicable    Additional concerns today:  No    Additional concerns: groin pain started last month     Today's PHQ-2 Score:       8/21/2023    11:14 AM   PHQ-2 ( 1999 Pfizer)   Q1: Little interest or pleasure in doing things 0   Q2: Feeling down, depressed or hopeless 0   PHQ-2 Score 0   Q1: Little interest or pleasure in doing things Not at all   Q2: Feeling down, depressed or hopeless Not at all   PHQ-2 Score 0                   Have you ever done Advance Care Planning? (For example, a Health Directive, POLST, or a discussion with a medical provider or your loved ones about your wishes): Yes, patient states has an Advance Care Planning document and will bring a copy to the clinic.    Social History     Tobacco Use    Smoking status: Never    Smokeless tobacco: Never   Substance Use Topics    Alcohol use: Yes     Comment: leida             8/21/2023    11:14 AM   Alcohol Use   Prescreen: >3 drinks/day or >7 drinks/week? No       Last PSA: No results found for: PSA    Reviewed orders with patient. Reviewed health maintenance and updated orders accordingly - Yes  Labs reviewed in EPIC    Reviewed and updated as needed this visit by clinical staff                  Reviewed and updated as needed this visit by Provider                     Review of Systems   Constitutional:  Negative for chills and fever.   HENT:  Negative for congestion, ear pain, hearing loss and sore throat.    Eyes:  Negative for pain and visual disturbance.   Respiratory:  Negative for  cough and shortness of breath.    Cardiovascular:  Negative for chest pain, palpitations and peripheral edema.   Gastrointestinal:  Positive for abdominal pain. Negative for constipation, diarrhea, heartburn, hematochezia and nausea.   Genitourinary:  Positive for impotence. Negative for dysuria, frequency, genital sores, hematuria, penile discharge and urgency.   Musculoskeletal:  Negative for arthralgias, joint swelling and myalgias.   Skin:  Negative for rash.   Neurological:  Negative for dizziness, weakness, headaches and paresthesias.   Psychiatric/Behavioral:  Negative for mood changes. The patient is not nervous/anxious.      Had an eye exam 6 months ago.  Has noticed some intermittent pain left great region..  Monogamous.  No history of STD.  No dysuria, hematuria, hemospermia.  Discomfort Will increase after ejaculation.  Wakes up occasionally with erections.  Has occasional trouble maintaining erection or getting an erection.  Denies significant life stress.  Occasional premature ejaculation which is improved with use of a condom    OBJECTIVE:   /80   Pulse 60   Temp 98.3  F (36.8  C) (Oral)   Ht 1.829 m (6')   Wt 85 kg (187 lb 4.8 oz)   SpO2 100%   BMI 25.40 kg/m      Physical Exam  General appearance - healthy, alert, no distress  Skin - No rashes or lesions.  Head - normocephalic, atraumatic  Eyes - MALISSA, EOMI, fundi exam with nondilated pupils negative.  Ears - External ears normal. Canals clear. TM's normal.  Nose/Sinuses - Nares normal. Septum midline. Mucosa normal. No drainage or sinus tenderness.  Oropharynx - No erythema, no adenopathy, no exudates.  Neck - Supple without adenopathy or thyromegaly. No bruits.  Lungs - Clear to auscultation without wheezes/rhonchi.  Heart - Regular rate and rhythm without murmurs, clicks, or gallops.  Nodes - No supraclavicular, axillary, or inguinal adenopathy palpable.  Abdomen - Abdomen soft, non-tender. BS normal. No masses or hepatosplenomegaly  palpable. No bruits.  Extremities -No cyanosis, clubbing or edema.    Musculoskeletal - Spine ROM normal. Muscular strength intact.   Peripheral pulses - radial=4/4, femoral=4/4, posterior tibial=4/4, dorsalis pedis=4/4,  Neuro - Gait normal. Reflexes normal and symmetric. Sensation grossly WNL.  Genital - Normal-appearing male external genitalia. No scrotal masses or inguinal hernia palpable.  Mild tenderness palpation left epididymal region  Rectal -            ASSESSMENT/PLAN:   1. Encounter for routine adult medical exam with abnormal findings  Screening labs as ordered.  See plan discussion below regarding healthcare maintenance for vaccinations.  Patient's mother has a history of colon polyps and he will check to see when she was first diagnosed with a polyp and inform MD if prior to age 54 yo so colonoscopy can be done earlier than standard 46 yo  - Comprehensive metabolic panel; Future  - Lipid panel reflex to direct LDL Fasting; Future  - CBC with platelets; Future  - Testosterone Free and Total; Future  - Comprehensive metabolic panel  - Lipid panel reflex to direct LDL Fasting  - CBC with platelets  - Testosterone Free and Total    2. Epididymitis, left  Symptoms present for about a month.  We will treat with Cipro.  Patient Will regarding possible muscle/tendon side effects.  If symptoms not resolved in 3 weeks, patient to inform physician and will get scrotal ultrasound  - ciprofloxacin (CIPRO) 500 MG tablet; Take 1 tablet (500 mg) by mouth 2 times daily for 7 days  Dispense: 14 tablet; Refill: 0    3. Erectile dysfunction, unspecified erectile dysfunction type  Mild.  Not to point patient needing medication therapy at this time such as Viagra.  Lab was ordered.  Patient wishes to try ED med in future, will send me note in Mychart  - Testosterone Free and Total; Future  - Testosterone Free and Total      Patient has been advised of split billing requirements and indicates understanding:  Yes      COUNSELING:   Reviewed preventive health counseling, as reflected in patient instructions        He reports that he has never smoked. He has never used smokeless tobacco.       PLAN:  vCipro 500mg tab, 1 tab twice a day for 7 days for probable epididymitis infection. Stop if new  acute muscle pain  If left groin issues persists, 3 weeks  then let me know and will get a scrotal  ultrasound  Labs as ordered  Covid  booster  and influenza/flu vaccinations in the  Fall (October).  May get through a pharmacy or at clinic  Send a note in [employer] in future if wish to consider trial of Viagra for erectile function  Pt was informed regarding extra E&M billing for management of new or established medical issues not related to today's wellness/screening visit    Berry Hancock MD  [facility]

## 2023-08-24 LAB
TESTOST FREE SERPL-MCNC: 8.03 NG/DL
TESTOST SERPL-MCNC: 521 NG/DL (ref 240–950)

## 2023-09-12 ENCOUNTER — MYC MEDICAL ADVICE (OUTPATIENT)
Dept: INTERNAL MEDICINE | Facility: CLINIC | Age: 38
End: 2023-09-12
Payer: COMMERCIAL

## 2023-09-12 DIAGNOSIS — N50.82 SCROTAL PAIN: Primary | ICD-10-CM

## 2023-09-13 ENCOUNTER — TELEPHONE (OUTPATIENT)
Dept: INTERNAL MEDICINE | Facility: CLINIC | Age: 38
End: 2023-09-13
Payer: COMMERCIAL

## 2023-09-13 NOTE — TELEPHONE ENCOUNTER
Reason for Call:  Form, our goal is to have forms completed with 72 hours, however, some forms may require a visit or additional information.    Type of letter, form or note:   Physician Results Form    Who is the form from?:  [employer] (if other please explain)    Where did the form come from: Patient or family brought in       What clinic location was the form placed at?: Internal Medicine    Where the form was placed: Given to physician    What number is listed as a contact on the form?:        Additional comments: Fax form to [phone]    Call taken on 9/13/2023 at 7:26 AM by Ponce Garibay

## 2023-09-20 ENCOUNTER — ANCILLARY PROCEDURE (OUTPATIENT)
Dept: ULTRASOUND IMAGING | Facility: CLINIC | Age: 38
End: 2023-09-20
Attending: INTERNAL MEDICINE
Payer: COMMERCIAL

## 2023-09-20 DIAGNOSIS — N50.82 SCROTAL PAIN: ICD-10-CM

## 2023-09-20 PROCEDURE — 76870 US EXAM SCROTUM: CPT

## 2023-10-04 ENCOUNTER — MYC MEDICAL ADVICE (OUTPATIENT)
Dept: INTERNAL MEDICINE | Facility: CLINIC | Age: 38
End: 2023-10-04
Payer: COMMERCIAL

## 2023-10-04 DIAGNOSIS — N50.3 EPIDIDYMAL CYST: ICD-10-CM

## 2023-10-04 DIAGNOSIS — I86.1 VARICOCELE: ICD-10-CM

## 2023-10-04 DIAGNOSIS — K40.90 LEFT INGUINAL HERNIA: Primary | ICD-10-CM

## 2023-10-13 NOTE — TELEPHONE ENCOUNTER
Dr. Hancock,    Please see [employer] message and advise.     Testicular US impression from 9/20/23 was as follows:  IMPRESSION:  1.  4 mm left epididymal head cyst.  2.  Left-sided varicoceles.  3.  Small fat-containing left inguinal hernia.    Thank you,  Esha Martinez, RN

## 2023-10-20 NOTE — TELEPHONE ENCOUNTER
Spoke with pt and referred to  [facility]  for probable  LIH cause of pain. Will have pt see Urology also for other issues as does get some pain still with ejaculation but also mostly in left groin area rather than scrotal area so more likely the cyst and varicoceles are  noncontributory. Pt will first see general surgeon and if they think pain not coming from LIH, then pt will see Urology. Pt will call for surgery consult. Using  supportive briefs for underwear   (4) no impairment

## 2023-11-13 ENCOUNTER — OFFICE VISIT (OUTPATIENT)
Dept: SURGERY | Facility: CLINIC | Age: 38
End: 2023-11-13
Payer: COMMERCIAL

## 2023-11-13 VITALS
SYSTOLIC BLOOD PRESSURE: 120 MMHG | RESPIRATION RATE: 16 BRPM | HEART RATE: 83 BPM | HEIGHT: 72 IN | OXYGEN SATURATION: 98 % | DIASTOLIC BLOOD PRESSURE: 72 MMHG | BODY MASS INDEX: 25.06 KG/M2 | WEIGHT: 185 LBS

## 2023-11-13 DIAGNOSIS — K40.90 LEFT INGUINAL HERNIA: Primary | ICD-10-CM

## 2023-11-13 PROCEDURE — 99204 OFFICE O/P NEW MOD 45 MIN: CPT | Performed by: SURGERY

## 2023-11-13 NOTE — PROGRESS NOTES
[facility]  Surgery Consultation    CONSULTATION REQUESTED BY:  Berry Hancock [phone]    HPI: This patient is an exceedingly pleasant 38-year-old gentleman referred by the above provider for consultation regarding left inguinal hernia.  He reports that in July of this past year somewhat out of the blue without any identifying triggers he began to have discomfort in the left groin area particularly during ejaculation.  He also had some mild discomfort in his testicle.  Some radiating pain also partly of the left lower back.  He saw his primary care provider and was started on antibiotics presumptively for epididymitis.  After 3 weeks of therapy had not noted any significant changes.  His discomfort would persist with sexual activity.  He was ultimately sent for an ultrasound which showed a small fat-containing left inguinal hernia as well as a small epididymal cyst and varicocele.  Within the last 2 weeks the patient reports that his pain has now resolved.  He has not had any discomfort or pain with physical activity.  He has had no pain on coughing or sneezing.  He has had no symptoms to suggest incarceration or strangulation.    PMH:   has a past medical history of Anxiety (07/27/2019), Chilblains (03/2021), Depression, Hair loss, and Other acne.  PSH:    has a past surgical history that includes biopsy (09/2019).  Social History:   reports that he has never smoked. He has never used smokeless tobacco. He reports current alcohol use of about 5.0 standard drinks of alcohol per week. He reports that he does not use drugs.  Family History:  family history includes Cancer in his mother; Heart Murmur in his mother; Hyperlipidemia in his father; Hypertension in his father; Osteoporosis in his mother.  Medications/Allergies: Home medications and allergies reviewed.    ROS:  The 10 point Review of Systems is negative other than noted in the HPI.    Physical Exam:  /72   Pulse 83   Resp 16    Ht 1.829 m (6')   Wt 83.9 kg (185 lb)   SpO2 98%   BMI 25.09 kg/m    GENERAL: Generally appears well.  Psych: Alert and Oriented.  Normal affect  Eyes: Sclera clear  Respiratory:  Lungs clear to ausculation bilaterally with good air excursion  Cardiovascular:  Regular Rate and Rhythm with no murmurs gallops or rubs, normal peripheral pulses  GI: Abdomen Non Distended Soft Non-Tender  No hernias palpated..  Groin- I examined the patient in both the standing and supine positions. Right Groin- No hernia Palpated. Left Groin- Small inguinal hernia. No scrotal or testicle abnormalities.  Lymphatic/Hematologic/Immune:  No femoral or cervical lymphadenopathy.  Integumentary:  No rashes  Neurological: grossly intact     All new lab and imaging data was reviewed.     Impression and Plan:  Patient is a 38 year old male with small fat-containing left inguinal hernia.    PLAN: I discussed at great length with him his management options.  Ultimately I feel his symptoms were likely more urologic in nature and they have now since improved.  That being said the presence of the hernia may contribute slightly to some of his symptoms but again I do not believe it is the primary cause.  In the setting of symptoms having improved I do not know that there is any need to move forward with urologic evaluation but I did provide him name as a resource should he decide to seek it out.  With respect to the hernia I did describe to him minimally invasive inguinal hernia repair that could be performed at a time and date of his convenience.  For now it appears that he is interested in simple watchful waiting and I think that this is fine and appropriate.  I discussed the pathophysiology of hernias and options for repair including laparoscopic VS open.  The risks associated with the procedure including, but not limited to, recurrence, nerve entrapment or injury, persistence of pain, injury to the bowel/bladder, infertility, hematoma, mesh  migration, mesh infection, MI, and PE were discussed with the patient. He indicated understanding of the discussion, asked appropriate questions, and provided consent. Signs and symptoms of incarceration were discussed. If these develop in the interim, he promises to call or go straight to the ER. I have provided the patient with an information pamphlet.    Thank you very much for this consult.    Germán Alvarado M.D.  [facility]  [phone]    Please route or send letter to:  Primary Care Provider (PCP) and Referring Provider

## 2023-11-30 NOTE — TELEPHONE ENCOUNTER
MEDICAL RECORDS REQUEST   [facility]  Urology Clinic  [address] [place] [address]  PHONE: [phone]  Fax: [phone]        FUTURE VISIT INFORMATION                                                   Germán Moreira, : 1985 scheduled for future visit at [facility]    APPOINTMENT INFORMATION:  Date: 2023  Provider:  Demetris Sagastume PA-C  Reason for Visit/Diagnosis: Spermatocele + Varicocele    REFERRAL INFORMATION:  Referring provider:  Berry Hancock MD in  INTERNAL MEDICINE      RECORDS REQUESTED FOR VISIT                                                     NOTES  STATUS/DETAILS   OFFICE NOTE from referring provider  yes, Berry Hancock MD in  INTERNAL MEDICINE   IMAGES  yes, 2023 -- US TESTICLE AND SCROTUM   MEDICATION LIST  yes     PRE-VISIT CHECKLIST      Joint diagnostic appointment coordinated correctly          (ensure right order & amount of time) Yes   RECORD COLLECTION COMPLETE Yes

## 2023-12-07 ENCOUNTER — PRE VISIT (OUTPATIENT)
Dept: UROLOGY | Facility: CLINIC | Age: 38
End: 2023-12-07
Payer: COMMERCIAL

## 2023-12-07 NOTE — TELEPHONE ENCOUNTER
Reason for visit: consult for      Dx/Hx/Sx: spermatocele + varicocele     Records/imaging/labs/orders: in epic    At Rooming: standard

## 2023-12-21 NOTE — PROGRESS NOTES
"Subjective     REQUESTING PROVIDER  Dr. Berry Hancock MD    REASON FOR VISIT  Testicular pain    HISTORY OF PRESENT ILLNESS  Mr. Moreira is a pleasant 38 year old male with a past medical history significant for hair loss and anxiety who presents today for further discussion of his bothersome testicular discomfort.  I personally reviewed the family practice visit note from 8/22/2023 and the general surgery note from 11/13/23 in preparation for today's visit.    It appears that he has been having some pain or discomfort for approximately 6 months, and was previously treated with Cipro for suspected epididymitis prior to getting a testicular ultrasound.  However when symptoms did not resolve testicular ultrasound was completed showing a 4 mm left-sided epididymal head cyst as well as a left-sided varicocele.  Ultimately was referred to urology for further discussion and evaluation.    Of note,  was also seen by general surgery on 11/13/2023 in regards to the small fat-containing inguinal hernia on the left.  Based on their notes, it appears that they were not necessarily convinced that this hernia was the cause of all of his symptoms, but did discuss surgical options with him.    Today:  Was mostly a \"heavy\" feeling in the left testicle, but now has subsided, mostly more intermittent  Feels the left testicle has always been a bit bigger than the right   In addition to the heavy feeling, also ejaculatory pain on the left in the suprapubic or upper inguinal area  Ejaculatory pain subsides right after ejaculation - mostly pain through the \"process\" of ejaculation  No gross hematuria or hematospermia  No history of trauma or surgery on the testicle   No baseline bothersome urinary symptoms, and no worsening recently   Heaviness is more noticeable when not wearing supportive underwear    REVIEW OF SYSTEMS  Review of Systems   Constitutional:  Negative for activity change and unexpected weight change.   HENT:  Negative " for ear pain and tinnitus.    Eyes:  Negative for visual disturbance.   Respiratory:  Negative for shortness of breath.    Cardiovascular:  Negative for chest pain.   Gastrointestinal:  Negative for abdominal pain and constipation.   Genitourinary:  Negative for hematuria.   Musculoskeletal:  Negative for back pain.   Neurological:  Negative for dizziness and light-headedness.   Hematological:  Negative for adenopathy.   Psychiatric/Behavioral:  Negative for sleep disturbance.      Social History:  Denies any history of or current smoking     Family History:  Denies any known family history of urologic malignancy     Grandfather with history of colon cancer     Objective     PHYSICAL EXAM  Physical Exam  Constitutional:       Appearance: Normal appearance.   HENT:      Head: Normocephalic and atraumatic.      Nose: No congestion.      Mouth/Throat:      Mouth: Mucous membranes are moist.   Eyes:      General:         Right eye: No discharge.         Left eye: No discharge.   Pulmonary:      Effort: No respiratory distress.   Abdominal:      General: There is no distension.   Genitourinary:     Comments: Circumcised phallus with no evidence of abnormal penile lesion or abnormal discharge from the penis.  Penile shaft palpated with no evidence of plaque or nodule.    Right testicle with no evidence of nodularity or induration and right spermatic cord palpated with no evidence of varicocele with or without Valsalva.    Left testicle palpated with a slightly larger epididymis as well as easily palpable varicoceles without Valsalva.  Varicocele is not visible without palpation.  Varicocele gets slightly larger with Valsalva.  Musculoskeletal:         General: No deformity.   Skin:     General: Skin is warm and dry.   Neurological:      Mental Status: He is alert and oriented to person, place, and time.   Psychiatric:         Mood and Affect: Mood normal.         Behavior: Behavior normal.       LABORATORY  No recent  urine testing    IMAGING  I personally reviewed and interpreted the below scrotal ultrasound from 9/20/2023 and note the left-sided varicoceles as well as the small inguinal hernia    Narrative & Impression   EXAM: US TESTICULAR AND SCROTUM WITH DOPPLER LIMITED  LOCATION: [facility]  DATE: 9/20/2023     INDICATION: left epididymal area  pain for 1 month despite abx therapy. Assess for mass,cyst,varicocele,other  COMPARISON: None.  TECHNIQUE: Ultrasound of scrotum with color flow and spectral Doppler with waveform analysis performed.     FINDINGS:     RIGHT: Right testicle measures 4.5 x 3.2 x 2.1 cm. Normal testicle with no masses. Normal arterial duplex and normal color flow. Normal epididymis. No hydrocele. No varicocele.     LEFT: Left testicle measures 4.3 x 2.9 x 2.1 cm. Normal testicle with no masses. Normal arterial duplex and normal color flow. 4 mm cyst seen within the epididymal head No hydrocele. Varicoceles are present. Small fat-containing left inguinal hernia is   present.                                                                      IMPRESSION:  1.  4 mm left epididymal head cyst.  2.  Left-sided varicoceles.  3.  Small fat-containing left inguinal hernia.       Assessment & Plan   Left-sided varicoceles  Left-sided small fat-containing inguinal hernia  Pain with the onset of ejaculation  Heavy left testicular pain, intermittent, resolving    It was my pleasure to meet with Mr. Moreira in the office today in regards to his resolving left-sided intermittent testicular discomfort described as heavy as well as his left-sided varicocele and inguinal hernia.  After reviewing his clinical history and available imaging, I am first happy to let him know that I do not see any evidence of anything dangerous going on.  Specifically, his ultrasound did not reveal any evidence of abnormal blood flow to testicles, twisting of the testicles, or masses in the testicles.  With this in  mind, we spent the majority of her time discussing the diagnosis of varicoceles.    We discussed that varicoceles are just enlarged veins leading down to the testicle and can be associated with the discomfort usually described as heavy.  However, generally speaking varicoceles would not be associated with ejaculatory discomfort of any kind.  I am slightly more concerned that the inguinal hernia seeing somewhere it is not supposed to would be causing some of the ejaculatory discomfort.  However, after learning the different potential etiologies behind this discomfort,  states that he is not necessarily in a hurry to proceed with either a varicocele repair or an inguinal hernia repair unless things were to get worse.  I believe this is very reasonable, and ultimately he can decide if and when he would like to follow-up with either our team or the general surgery team for discussion of surgery.    Mr. Moreira expressed understanding and agreement to the above discussion and plan and all of his questions were answered to his satisfaction. A business card was provided as a point of contact.     PLAN  Observation of left-sided testicular discomfort at baseline and with ejaculation  Follow-up with urology as needed    Signed by:    Demetris Sagastume PA-C    I spent a total of 35 minutes spent on the date of the encounter doing chart review, history and exam, documentation, and further activities as noted above.

## 2023-12-22 ENCOUNTER — OFFICE VISIT (OUTPATIENT)
Dept: UROLOGY | Facility: CLINIC | Age: 38
End: 2023-12-22
Attending: INTERNAL MEDICINE
Payer: COMMERCIAL

## 2023-12-22 ENCOUNTER — PRE VISIT (OUTPATIENT)
Dept: UROLOGY | Facility: CLINIC | Age: 38
End: 2023-12-22

## 2023-12-22 VITALS
DIASTOLIC BLOOD PRESSURE: 72 MMHG | HEART RATE: 56 BPM | WEIGHT: 185 LBS | BODY MASS INDEX: 25.06 KG/M2 | HEIGHT: 72 IN | SYSTOLIC BLOOD PRESSURE: 121 MMHG

## 2023-12-22 DIAGNOSIS — K40.90 NON-RECURRENT UNILATERAL INGUINAL HERNIA WITHOUT OBSTRUCTION OR GANGRENE: Primary | ICD-10-CM

## 2023-12-22 DIAGNOSIS — I86.1 VARICOCELE: ICD-10-CM

## 2023-12-22 DIAGNOSIS — N50.3 EPIDIDYMAL CYST: ICD-10-CM

## 2023-12-22 PROCEDURE — 99203 OFFICE O/P NEW LOW 30 MIN: CPT | Performed by: STUDENT IN AN ORGANIZED HEALTH CARE EDUCATION/TRAINING PROGRAM

## 2023-12-22 ASSESSMENT — ENCOUNTER SYMPTOMS
CONSTIPATION: 0
ACTIVITY CHANGE: 0
LIGHT-HEADEDNESS: 0
SLEEP DISTURBANCE: 0
ADENOPATHY: 0
ABDOMINAL PAIN: 0
SHORTNESS OF BREATH: 0
UNEXPECTED WEIGHT CHANGE: 0
DIZZINESS: 0
HEMATURIA: 0
BACK PAIN: 0

## 2023-12-22 ASSESSMENT — PAIN SCALES - GENERAL: PAINLEVEL: NO PAIN (0)

## 2023-12-22 NOTE — NURSING NOTE
Chief Complaint   Patient presents with    Consult For     Testicular pain, Spermatocele, and Varicocele       Blood pressure 121/72, pulse 56, height 1.829 m (6'), weight 83.9 kg (185 lb). Body mass index is 25.09 kg/m .    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       Social History     Tobacco Use    Smoking status: Never    Smokeless tobacco: Never   Substance Use Topics    Alcohol use: Yes     Alcohol/week: 5.0 standard drinks of alcohol     Types: 5 Cans of beer per week     Comment: casual    Drug use: No       Jina Torres MA  12/22/2023  8:06 AM

## 2023-12-22 NOTE — LETTER
"12/22/2023       RE: Germán Moreira  [address] [id number]     Dear Colleague,    Thank you for referring your patient, Germán Moreira, to the [facility] [place] at [facility]. Please see a copy of my visit note below.    Subjective    REQUESTING PROVIDER  Dr. Berry Hancock MD    REASON FOR VISIT  Testicular pain    HISTORY OF PRESENT ILLNESS  Mr. Moreira is a pleasant 38 year old male with a past medical history significant for hair loss and anxiety who presents today for further discussion of his bothersome testicular discomfort.  I personally reviewed the family practice visit note from 8/22/2023 and the general surgery note from 11/13/23 in preparation for today's visit.    It appears that he has been having some pain or discomfort for approximately 6 months, and was previously treated with Cipro for suspected epididymitis prior to getting a testicular ultrasound.  However when symptoms did not resolve testicular ultrasound was completed showing a 4 mm left-sided epididymal head cyst as well as a left-sided varicocele.  Ultimately was referred to urology for further discussion and evaluation.    Of note,  was also seen by general surgery on 11/13/2023 in regards to the small fat-containing inguinal hernia on the left.  Based on their notes, it appears that they were not necessarily convinced that this hernia was the cause of all of his symptoms, but did discuss surgical options with him.    Today:  Was mostly a \"heavy\" feeling in the left testicle, but now has subsided, mostly more intermittent  Feels the left testicle has always been a bit bigger than the right   In addition to the heavy feeling, also ejaculatory pain on the left in the suprapubic or upper inguinal area  Ejaculatory pain subsides right after ejaculation - mostly pain through the \"process\" of ejaculation  No gross hematuria or hematospermia  No " history of trauma or surgery on the testicle   No baseline bothersome urinary symptoms, and no worsening recently   Heaviness is more noticeable when not wearing supportive underwear    REVIEW OF SYSTEMS  Review of Systems   Constitutional:  Negative for activity change and unexpected weight change.   HENT:  Negative for ear pain and tinnitus.    Eyes:  Negative for visual disturbance.   Respiratory:  Negative for shortness of breath.    Cardiovascular:  Negative for chest pain.   Gastrointestinal:  Negative for abdominal pain and constipation.   Genitourinary:  Negative for hematuria.   Musculoskeletal:  Negative for back pain.   Neurological:  Negative for dizziness and light-headedness.   Hematological:  Negative for adenopathy.   Psychiatric/Behavioral:  Negative for sleep disturbance.      Social History:  Denies any history of or current smoking     Family History:  Denies any known family history of urologic malignancy     Grandfather with history of colon cancer     Objective    PHYSICAL EXAM  Physical Exam  Constitutional:       Appearance: Normal appearance.   HENT:      Head: Normocephalic and atraumatic.      Nose: No congestion.      Mouth/Throat:      Mouth: Mucous membranes are moist.   Eyes:      General:         Right eye: No discharge.         Left eye: No discharge.   Pulmonary:      Effort: No respiratory distress.   Abdominal:      General: There is no distension.   Genitourinary:     Comments: Circumcised phallus with no evidence of abnormal penile lesion or abnormal discharge from the penis.  Penile shaft palpated with no evidence of plaque or nodule.    Right testicle with no evidence of nodularity or induration and right spermatic cord palpated with no evidence of varicocele with or without Valsalva.    Left testicle palpated with a slightly larger epididymis as well as easily palpable varicoceles without Valsalva.  Varicocele is not visible without palpation.  Varicocele gets slightly  larger with Valsalva.  Musculoskeletal:         General: No deformity.   Skin:     General: Skin is warm and dry.   Neurological:      Mental Status: He is alert and oriented to person, place, and time.   Psychiatric:         Mood and Affect: Mood normal.         Behavior: Behavior normal.       LABORATORY  No recent urine testing    IMAGING  I personally reviewed and interpreted the below scrotal ultrasound from 9/20/2023 and note the left-sided varicoceles as well as the small inguinal hernia    Narrative & Impression   EXAM: US TESTICULAR AND SCROTUM WITH DOPPLER LIMITED  LOCATION: [facility]  DATE: 9/20/2023     INDICATION: left epididymal area  pain for 1 month despite abx therapy. Assess for mass,cyst,varicocele,other  COMPARISON: None.  TECHNIQUE: Ultrasound of scrotum with color flow and spectral Doppler with waveform analysis performed.     FINDINGS:     RIGHT: Right testicle measures 4.5 x 3.2 x 2.1 cm. Normal testicle with no masses. Normal arterial duplex and normal color flow. Normal epididymis. No hydrocele. No varicocele.     LEFT: Left testicle measures 4.3 x 2.9 x 2.1 cm. Normal testicle with no masses. Normal arterial duplex and normal color flow. 4 mm cyst seen within the epididymal head No hydrocele. Varicoceles are present. Small fat-containing left inguinal hernia is   present.                                                                      IMPRESSION:  1.  4 mm left epididymal head cyst.  2.  Left-sided varicoceles.  3.  Small fat-containing left inguinal hernia.       Assessment & Plan  Left-sided varicoceles  Left-sided small fat-containing inguinal hernia  Pain with the onset of ejaculation  Heavy left testicular pain, intermittent, resolving    It was my pleasure to meet with Mr. Moreira in the office today in regards to his resolving left-sided intermittent testicular discomfort described as heavy as well as his left-sided varicocele and inguinal hernia.   After reviewing his clinical history and available imaging, I am first happy to let him know that I do not see any evidence of anything dangerous going on.  Specifically, his ultrasound did not reveal any evidence of abnormal blood flow to testicles, twisting of the testicles, or masses in the testicles.  With this in mind, we spent the majority of her time discussing the diagnosis of varicoceles.    We discussed that varicoceles are just enlarged veins leading down to the testicle and can be associated with the discomfort usually described as heavy.  However, generally speaking varicoceles would not be associated with ejaculatory discomfort of any kind.  I am slightly more concerned that the inguinal hernia seeing somewhere it is not supposed to would be causing some of the ejaculatory discomfort.  However, after learning the different potential etiologies behind this discomfort,  states that he is not necessarily in a hurry to proceed with either a varicocele repair or an inguinal hernia repair unless things were to get worse.  I believe this is very reasonable, and ultimately he can decide if and when he would like to follow-up with either our team or the general surgery team for discussion of surgery.    Mr. Moreira expressed understanding and agreement to the above discussion and plan and all of his questions were answered to his satisfaction. A business card was provided as a point of contact.     PLAN  Observation of left-sided testicular discomfort at baseline and with ejaculation  Follow-up with urology as needed    Signed by:    Demetris Sagastume PA-C    I spent a total of 35 minutes spent on the date of the encounter doing chart review, history and exam, documentation, and further activities as noted above.

## 2023-12-28 PROBLEM — K40.90 LEFT INGUINAL HERNIA: Status: ACTIVE | Noted: 2023-12-28

## 2023-12-28 PROBLEM — I86.1 LEFT VARICOCELE: Status: ACTIVE | Noted: 2023-12-28

## 2024-04-14 ENCOUNTER — MYC MEDICAL ADVICE (OUTPATIENT)
Dept: INTERNAL MEDICINE | Facility: CLINIC | Age: 39
End: 2024-04-14
Payer: COMMERCIAL

## 2024-04-14 DIAGNOSIS — N50.82 SCROTAL PAIN: Primary | ICD-10-CM

## 2024-04-17 ENCOUNTER — TELEPHONE (OUTPATIENT)
Dept: UROLOGY | Facility: CLINIC | Age: 39
End: 2024-04-17
Payer: COMMERCIAL

## 2024-04-17 NOTE — TELEPHONE ENCOUNTER
[facility] Call Center    Phone Message    May a detailed message be left on voicemail: yes     Reason for Call: Appointment Intake    Referring Provider Name: Berry Hancock MD   Diagnosis and/or Symptoms: Diagnosis Scrotal Pain   Patient of Demetris Sagastume, patient wishes to see a surgeon as that was the recommendation, writer sending message for clinical review, please call patient back to discuss his options.     Action Taken: Message routed to:  [facility]rology Adult    Travel Screening: Not Applicable

## 2024-04-22 NOTE — TELEPHONE ENCOUNTER
[facility] Call Center    Phone Message    May a detailed message be left on voicemail: yes     Reason for Call: Patient is following up regarding previous TE. Please call patient to discuss options.    Action Taken: Message routed to:  [facility] Urology    Travel Screening: Not Applicable

## 2024-05-07 ENCOUNTER — MYC MEDICAL ADVICE (OUTPATIENT)
Dept: INTERNAL MEDICINE | Facility: CLINIC | Age: 39
End: 2024-05-07

## 2024-06-07 ENCOUNTER — PRE VISIT (OUTPATIENT)
Dept: UROLOGY | Facility: CLINIC | Age: 39
End: 2024-06-07
Payer: COMMERCIAL

## 2024-06-07 NOTE — TELEPHONE ENCOUNTER
Reason for visit: Testicular Varicocele     Relevant information: some hx of a epididymal cyst on last visit (12/22/23)    Records/imaging/labs/orders: all records available    Pt called: no need for a call    At Rooming: have pt empty bladder/pvr,  Standard rooming      Tomas Wright  6/7/2024  2:55 PM

## 2024-06-28 ENCOUNTER — OFFICE VISIT (OUTPATIENT)
Dept: UROLOGY | Facility: CLINIC | Age: 39
End: 2024-06-28
Payer: COMMERCIAL

## 2024-06-28 VITALS
OXYGEN SATURATION: 99 % | BODY MASS INDEX: 24.38 KG/M2 | DIASTOLIC BLOOD PRESSURE: 79 MMHG | HEIGHT: 72 IN | HEART RATE: 64 BPM | SYSTOLIC BLOOD PRESSURE: 121 MMHG | WEIGHT: 180 LBS

## 2024-06-28 DIAGNOSIS — N50.812 PAIN IN LEFT TESTICLE: ICD-10-CM

## 2024-06-28 DIAGNOSIS — N43.40 SPERMATOCELE: ICD-10-CM

## 2024-06-28 DIAGNOSIS — I86.1 VARICOCELE: Primary | ICD-10-CM

## 2024-06-28 PROCEDURE — 99214 OFFICE O/P EST MOD 30 MIN: CPT | Performed by: UROLOGY

## 2024-06-28 ASSESSMENT — PAIN SCALES - GENERAL: PAINLEVEL: MODERATE PAIN (4)

## 2024-06-28 NOTE — LETTER
"6/28/2024       RE: Germán Moreira  [address] [id number]     Dear Colleague,    Thank you for referring your patient, Germán Moreira, to the [facility] [place] at [facility]. Please see a copy of my visit note below.    I am seeing Germán Moreira in consultation for evaluation of left varicocele .    HPI:  Germán Moreira is a 39 year old male with paniful ejaculation- left inguinal/ groin pain during ejaculation.    Scrotal ultrasound was revealing of left varicocele, small left epididymal head cyst, small left inguinal hernia.    Left groin pain with ejaculation.   \"Stretched/ pulled feeling\".  Not worse or better over the last several months.  At rest, standing/ sitting/lying not much pain.  Occasionally groin pain, not every day.  Tried antibiotics in the past- no help.  Has seen gen surg that advised observation of small left inguinal hernia.      PAST MEDICAL HX:  Past Medical History:   Diagnosis Date    Anxiety 07/27/2019    Chilblains 03/2021    Depression     Hair loss     Other acne        PAST SURG HX:  Past Surgical History:   Procedure Laterality Date    BIOPSY  09/2019    Lymphnode biopsy        FAMILY HX:  Family History   Problem Relation Age of Onset    Cancer Mother         Basal cell CA    Heart Murmur Mother         MVP    Osteoporosis Mother     Hypertension Father     Hyperlipidemia Father        SOCIAL HX:  Social History     Tobacco Use    Smoking status: Never    Smokeless tobacco: Never   Substance Use Topics    Alcohol use: Yes     Alcohol/week: 5.0 standard drinks of alcohol     Types: 5 Cans of beer per week     Comment: casual    Drug use: No       MEDICATIONS:  No prescription medications     ALLERGIES:  Seafood      GENERAL PHYSICAL EXAM:   /79   Pulse 64   Ht 1.829 m (6')   Wt 81.6 kg (180 lb)   SpO2 99%   BMI 24.41 kg/m     Constitutional: No acute distress. Well " nourished.   PSYCH: normal mood and affect.  NEURO: normal gait, no focal deficits.   EYES: anicteric, EOMI, PERR.  CARDIOPULMONARY: breathing non-labored, pulse regular rate/rhythm, no peripheral edema.  GI: Abdomen soft, non-tender, no surgical scars, no organomegaly.  MUSCULOSKELETAL: normal limb proportions, no muscle wasting, no contractures.  SKIN: Normal virilized hair distribution, no lesions, warts or rashes over genitalia, abdomen extremities or face.  HEME/LYMPH: no ecchymosis, no lymphadenopathy in groin, no lymphedema.     EXAM:  Phallus circumcised, meatus adequate, no plaques palpated.   Left testis descended , size is 14 , consistency is normal . No intra-testicular masses.   Right testis descended , size is 16 , consistency is normal . No intra-testicular masses.   Epididymes present, non-tender, not-enlarged.   Left cord: Vas present. Grade II  varicocele noted.  Right cord: Vas present. No  varicocele noted.     Imaging/labs:  Lab Results   Component Value Date    CR 0.96 08/22/2023    CR 0.87 08/12/2022    CR 0.94 02/22/2021    CR 0.92 08/31/2020    CR 0.95 06/10/2019           Scrotal ultrasound 9/20/23  IMPRESSION:  1.  4 mm left epididymal head cyst.  2.  Left-sided varicoceles.  3.  Small fat-containing left inguinal hernia.      ASSESSMENT:   Left epididymal head cyst  Left varicocele  Small left inguinal hernia.  Left testis pain/ groin pain with ejaculation    PLAN:  Left epididymal head cyst is incidental and nontender.  Observe.  Left varicocele is probably longstanding.  Discomfort with intercourse only is atypical for varicocele-type discomfort.    Discussed risks, benefits, and alternatives of varicocele repair, including various methods.  I counseled him extensively on the nature of varicoceles, and their possible effects on pain, fertility, and testosterone production.  I described surgery and embolization approaches, and the detailed risks of surgical repair.  These include  damage to artery (ischemia), damage to lymphatics ( hydrocele), as well as risk of recurrence (~1%)..  Discussed that varicocele pain typically improves with repair, but in rare cases the testis can become sensitive after a surgical repair.  Harder to know how much varicocele repair would help, as his discomfort is a little atypical for varicocele pain.   Discussed that varicoceles are common to see, his has probably been present since age 15.  Abdomen imaging is not routinely done unless varicocele presentation is atypical.  He did have an overall normal CT abdomen/pelvis 2018 (fatty liver)  I would be cautiously optimistic that varicocele repair would help his discomfort, but it's hard to know for certain.  There is a small chance that varicocele repair could make his testis more sensitive.  He will think about options and let me know if he has questions.    Pelvic floor physical therapy recommended.    I'm happy to see him back in the future as needed.     Demetris Rodgers MD     Urological Surgeon      --------------------------------------------------------------------------------------------------------------------   Additional Coding Information:    Problems:  3 -- one stable chronic illness    Data Reviewed  Scrotal ultrasound     Tests ordered/pending: N/A     Level of risk:  3 -- low risk (e.g., OTC medication or observation, minor surgery without risks)    Time spent:  31 minutes spent on the date of the encounter doing chart review, history and exam, documentation and further activities per the note

## 2024-06-28 NOTE — NURSING NOTE
Chief Complaint   Patient presents with    Consult     Testicular varicocele       Blood pressure 121/79, pulse 64, height 1.829 m (6'), weight 81.6 kg (180 lb), SpO2 99%. Body mass index is 24.41 kg/m .    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     Pt states its still like a a 3-4 on the pain scale, only comes and goes. When he feels pain consistently it would be in the groin area during ejaculation. More notes in Ash[facility] Lopez 12/22 visit for the pain associated with hernia. Was told to come back due to the pain still being consistent.    Social History     Tobacco Use    Smoking status: Never    Smokeless tobacco: Never   Substance Use Topics    Alcohol use: Yes     Alcohol/week: 5.0 standard drinks of alcohol     Types: 5 Cans of beer per week     Comment: casual    Drug use: Екатерина Wright  6/28/2024  7:02 AM

## 2024-06-28 NOTE — PROGRESS NOTES
"I am seeing Germán Moreira in consultation for evaluation of left varicocele .    HPI:  Germán Moreira is a 39 year old male with paniful ejaculation- left inguinal/ groin pain during ejaculation.    Scrotal ultrasound was revealing of left varicocele, small left epididymal head cyst, small left inguinal hernia.    Left groin pain with ejaculation.   \"Stretched/ pulled feeling\".  Not worse or better over the last several months.  At rest, standing/ sitting/lying not much pain.  Occasionally groin pain, not every day.  Tried antibiotics in the past- no help.  Has seen gen surg that advised observation of small left inguinal hernia.      PAST MEDICAL HX:  Past Medical History:   Diagnosis Date    Anxiety 07/27/2019    Chilblains 03/2021    Depression     Hair loss     Other acne        PAST SURG HX:  Past Surgical History:   Procedure Laterality Date    BIOPSY  09/2019    Lymphnode biopsy        FAMILY HX:  Family History   Problem Relation Age of Onset    Cancer Mother         Basal cell CA    Heart Murmur Mother         MVP    Osteoporosis Mother     Hypertension Father     Hyperlipidemia Father        SOCIAL HX:  Social History     Tobacco Use    Smoking status: Never    Smokeless tobacco: Never   Substance Use Topics    Alcohol use: Yes     Alcohol/week: 5.0 standard drinks of alcohol     Types: 5 Cans of beer per week     Comment: casual    Drug use: No       MEDICATIONS:  No prescription medications     ALLERGIES:  Seafood      GENERAL PHYSICAL EXAM:   /79   Pulse 64   Ht 1.829 m (6')   Wt 81.6 kg (180 lb)   SpO2 99%   BMI 24.41 kg/m     Constitutional: No acute distress. Well nourished.   PSYCH: normal mood and affect.  NEURO: normal gait, no focal deficits.   EYES: anicteric, EOMI, PERR.  CARDIOPULMONARY: breathing non-labored, pulse regular rate/rhythm, no peripheral edema.  GI: Abdomen soft, non-tender, no surgical scars, no organomegaly.  MUSCULOSKELETAL: normal limb proportions, no muscle " wasting, no contractures.  SKIN: Normal virilized hair distribution, no lesions, warts or rashes over genitalia, abdomen extremities or face.  HEME/LYMPH: no ecchymosis, no lymphadenopathy in groin, no lymphedema.     EXAM:  Phallus circumcised, meatus adequate, no plaques palpated.   Left testis descended , size is 14 , consistency is normal . No intra-testicular masses.   Right testis descended , size is 16 , consistency is normal . No intra-testicular masses.   Epididymes present, non-tender, not-enlarged.   Left cord: Vas present. Grade II  varicocele noted.  Right cord: Vas present. No  varicocele noted.     Imaging/labs:  Lab Results   Component Value Date    CR 0.96 08/22/2023    CR 0.87 08/12/2022    CR 0.94 02/22/2021    CR 0.92 08/31/2020    CR 0.95 06/10/2019           Scrotal ultrasound 9/20/23  IMPRESSION:  1.  4 mm left epididymal head cyst.  2.  Left-sided varicoceles.  3.  Small fat-containing left inguinal hernia.      ASSESSMENT:   Left epididymal head cyst  Left varicocele  Small left inguinal hernia.  Left testis pain/ groin pain with ejaculation    PLAN:  Left epididymal head cyst is incidental and nontender.  Observe.  Left varicocele is probably longstanding.  Discomfort with intercourse only is atypical for varicocele-type discomfort.    Discussed risks, benefits, and alternatives of varicocele repair, including various methods.  I counseled him extensively on the nature of varicoceles, and their possible effects on pain, fertility, and testosterone production.  I described surgery and embolization approaches, and the detailed risks of surgical repair.  These include damage to artery (ischemia), damage to lymphatics ( hydrocele), as well as risk of recurrence (~1%)..  Discussed that varicocele pain typically improves with repair, but in rare cases the testis can become sensitive after a surgical repair.  Harder to know how much varicocele repair would help, as his discomfort is a little  atypical for varicocele pain.   Discussed that varicoceles are common to see, his has probably been present since age 15.  Abdomen imaging is not routinely done unless varicocele presentation is atypical.  He did have an overall normal CT abdomen/pelvis 2018 (fatty liver)  I would be cautiously optimistic that varicocele repair would help his discomfort, but it's hard to know for certain.  There is a small chance that varicocele repair could make his testis more sensitive.  He will think about options and let me know if he has questions.    Pelvic floor physical therapy recommended.    I'm happy to see him back in the future as needed.     Demetris Rodgers MD     Urological Surgeon      --------------------------------------------------------------------------------------------------------------------   Additional Coding Information:    Problems:  3 -- one stable chronic illness    Data Reviewed  Scrotal ultrasound     Tests ordered/pending: N/A     Level of risk:  3 -- low risk (e.g., OTC medication or observation, minor surgery without risks)    Time spent:  31 minutes spent on the date of the encounter doing chart review, history and exam, documentation and further activities per the note

## 2024-07-28 SDOH — HEALTH STABILITY: PHYSICAL HEALTH: ON AVERAGE, HOW MANY MINUTES DO YOU ENGAGE IN EXERCISE AT THIS LEVEL?: 0 MIN

## 2024-07-28 SDOH — HEALTH STABILITY: PHYSICAL HEALTH: ON AVERAGE, HOW MANY DAYS PER WEEK DO YOU ENGAGE IN MODERATE TO STRENUOUS EXERCISE (LIKE A BRISK WALK)?: 0 DAYS

## 2024-07-28 ASSESSMENT — SOCIAL DETERMINANTS OF HEALTH (SDOH): HOW OFTEN DO YOU GET TOGETHER WITH FRIENDS OR RELATIVES?: THREE TIMES A WEEK

## 2024-07-30 ENCOUNTER — TRANSFERRED RECORDS (OUTPATIENT)
Dept: HEALTH INFORMATION MANAGEMENT | Facility: CLINIC | Age: 39
End: 2024-07-30
Payer: COMMERCIAL

## 2024-08-02 ENCOUNTER — PATIENT OUTREACH (OUTPATIENT)
Dept: CARE COORDINATION | Facility: CLINIC | Age: 39
End: 2024-08-02

## 2024-08-02 ENCOUNTER — OFFICE VISIT (OUTPATIENT)
Dept: INTERNAL MEDICINE | Facility: CLINIC | Age: 39
End: 2024-08-02
Payer: COMMERCIAL

## 2024-08-02 VITALS
OXYGEN SATURATION: 100 % | WEIGHT: 179.3 LBS | DIASTOLIC BLOOD PRESSURE: 71 MMHG | SYSTOLIC BLOOD PRESSURE: 116 MMHG | BODY MASS INDEX: 24.29 KG/M2 | TEMPERATURE: 97.1 F | HEIGHT: 72 IN | HEART RATE: 55 BPM

## 2024-08-02 DIAGNOSIS — M25.552 HIP PAIN, LEFT: ICD-10-CM

## 2024-08-02 DIAGNOSIS — F41.9 ANXIETY: ICD-10-CM

## 2024-08-02 DIAGNOSIS — N50.82 SCROTAL PAIN: ICD-10-CM

## 2024-08-02 DIAGNOSIS — Z00.01 ENCOUNTER FOR ROUTINE ADULT MEDICAL EXAM WITH ABNORMAL FINDINGS: ICD-10-CM

## 2024-08-02 LAB
ALBUMIN SERPL BCG-MCNC: 4.7 G/DL (ref 3.5–5.2)
ALP SERPL-CCNC: 69 U/L (ref 40–150)
ALT SERPL W P-5'-P-CCNC: 21 U/L (ref 0–70)
ANION GAP SERPL CALCULATED.3IONS-SCNC: 8 MMOL/L (ref 7–15)
AST SERPL W P-5'-P-CCNC: 20 U/L (ref 0–45)
BILIRUB SERPL-MCNC: 0.9 MG/DL
BUN SERPL-MCNC: 18.4 MG/DL (ref 6–20)
CALCIUM SERPL-MCNC: 9.4 MG/DL (ref 8.8–10.4)
CHLORIDE SERPL-SCNC: 102 MMOL/L (ref 98–107)
CHOLEST SERPL-MCNC: 195 MG/DL
CREAT SERPL-MCNC: 0.95 MG/DL (ref 0.67–1.17)
EGFRCR SERPLBLD CKD-EPI 2021: >90 ML/MIN/1.73M2
ERYTHROCYTE [DISTWIDTH] IN BLOOD BY AUTOMATED COUNT: 11.8 % (ref 10–15)
FASTING STATUS PATIENT QL REPORTED: YES
FASTING STATUS PATIENT QL REPORTED: YES
GLUCOSE SERPL-MCNC: 93 MG/DL (ref 70–99)
HCO3 SERPL-SCNC: 29 MMOL/L (ref 22–29)
HCT VFR BLD AUTO: 46 % (ref 40–53)
HDLC SERPL-MCNC: 51 MG/DL
HGB BLD-MCNC: 15.7 G/DL (ref 13.3–17.7)
LDLC SERPL CALC-MCNC: 133 MG/DL
MCH RBC QN AUTO: 30.8 PG (ref 26.5–33)
MCHC RBC AUTO-ENTMCNC: 34.1 G/DL (ref 31.5–36.5)
MCV RBC AUTO: 90 FL (ref 78–100)
NONHDLC SERPL-MCNC: 144 MG/DL
PLATELET # BLD AUTO: 257 10E3/UL (ref 150–450)
POTASSIUM SERPL-SCNC: 4 MMOL/L (ref 3.4–5.3)
PROT SERPL-MCNC: 7.7 G/DL (ref 6.4–8.3)
RBC # BLD AUTO: 5.09 10E6/UL (ref 4.4–5.9)
SODIUM SERPL-SCNC: 139 MMOL/L (ref 135–145)
TRIGL SERPL-MCNC: 54 MG/DL
WBC # BLD AUTO: 7.2 10E3/UL (ref 4–11)

## 2024-08-02 PROCEDURE — 80053 COMPREHEN METABOLIC PANEL: CPT | Performed by: INTERNAL MEDICINE

## 2024-08-02 PROCEDURE — 99395 PREV VISIT EST AGE 18-39: CPT | Performed by: INTERNAL MEDICINE

## 2024-08-02 PROCEDURE — 99213 OFFICE O/P EST LOW 20 MIN: CPT | Mod: 25 | Performed by: INTERNAL MEDICINE

## 2024-08-02 PROCEDURE — 36415 COLL VENOUS BLD VENIPUNCTURE: CPT | Performed by: INTERNAL MEDICINE

## 2024-08-02 PROCEDURE — 80061 LIPID PANEL: CPT | Performed by: INTERNAL MEDICINE

## 2024-08-02 PROCEDURE — 85027 COMPLETE CBC AUTOMATED: CPT | Performed by: INTERNAL MEDICINE

## 2024-08-02 RX ORDER — ESCITALOPRAM OXALATE 10 MG/1
10 TABLET ORAL DAILY
Qty: 30 TABLET | Refills: 11 | Status: SHIPPED | OUTPATIENT
Start: 2024-08-02 | End: 2024-09-27

## 2024-08-02 ASSESSMENT — ANXIETY QUESTIONNAIRES
GAD7 TOTAL SCORE: 10
IF YOU CHECKED OFF ANY PROBLEMS ON THIS QUESTIONNAIRE, HOW DIFFICULT HAVE THESE PROBLEMS MADE IT FOR YOU TO DO YOUR WORK, TAKE CARE OF THINGS AT HOME, OR GET ALONG WITH OTHER PEOPLE: SOMEWHAT DIFFICULT
1. FEELING NERVOUS, ANXIOUS, OR ON EDGE: MORE THAN HALF THE DAYS
GAD7 TOTAL SCORE: 10
3. WORRYING TOO MUCH ABOUT DIFFERENT THINGS: MORE THAN HALF THE DAYS
6. BECOMING EASILY ANNOYED OR IRRITABLE: SEVERAL DAYS
5. BEING SO RESTLESS THAT IT IS HARD TO SIT STILL: NOT AT ALL
2. NOT BEING ABLE TO STOP OR CONTROL WORRYING: MORE THAN HALF THE DAYS
7. FEELING AFRAID AS IF SOMETHING AWFUL MIGHT HAPPEN: MORE THAN HALF THE DAYS

## 2024-08-02 ASSESSMENT — PATIENT HEALTH QUESTIONNAIRE - PHQ9: 5. POOR APPETITE OR OVEREATING: SEVERAL DAYS

## 2024-08-02 NOTE — PROGRESS NOTES
Preventive Care Visit  [facility]  Berry Hancokc MD, Internal Medicine  Aug 2, 2024      ASSESSMENT:    1. Encounter for routine adult medical exam with abnormal findings  Screening labs as ordered.  Flu shot in the fall.  Patient declines COVID booster vaccination. Eye exam done approx  December 2023 and UTD  - Comprehensive metabolic panel; Future  - Lipid panel reflex to direct LDL Fasting; Future  - CBC with platelets; Future    2. Anxiety  Uncontrolled. GAD7 = 10.  Will start escitalopram.  Patient to consider restarting mental health counseling therapy.  Patient has done this through work previously.  Will follow-up in 1 month  - escitalopram (LEXAPRO) 10 MG tablet; Take 1 tablet (10 mg) by mouth daily  Dispense: 30 tablet; Refill: 11    3. Hip pain, left  Patient reports x-rays at [facility] showed some flattening of the femoral head left side.  Has hip MRI ordered for further evaluation.  Patient will follow-up with Ortho regarding management    4. Scrotal pain  Known small epididymal cyst and left varicocele.   General surgery did not feel small left inguinal hernia cause of sx and did not recommend surgery previously.  Discomfort only with ejaculation.  Urology not recommending surgical management at this time.  Possible pelvic floor therapy.  Recent urology note reviewed.  Continue management per urology      PLAN:  Escitalopram 10mg tab. Start by taking 1/2 tab daily for 1 week. Then if tolerating the medication OK but symptoms are not improving, increase the dose to 1 tab daily.  Take in the AM with food.  Possible initial side effects include jitteriness, nausea, insomnia. These are generally mild if present and resolve within a couple weeks. If severe side effects with the medication, then stop the medication and contact the clinic. Also contact the clinic immediately if you develop any suicidal ideation   Consider counseling/mental health therapy  Labs today as ordered  Follow up in 1  month.. Earlier as needed   OK for it to be a virtual visit. A video visit is preferred over a telephone visit if you have a smart phone or computer with a camera.  I would recommend you receive an influenza (flu) vaccine  this Fall (mid/late October) at the clinic or any pharmacy  Send us a copy of your healthcare directive to us via [employer] or drop off at the clinic or fax ([phone] attention Dr Hancock)  Follow-up with [facility] regarding left hip pain and future left hip MRI  Let me or Dr Rodgers (Urology) know if you wish to start pelvic floor therapy for scrotal/groin pain with ejaculation  Pt was informed regarding extra E&M billing for management of new or established medical issues not related to today's wellness/screening visit               Ebony Salinas is a 39 year old, presenting for the following:  Physical  And discussion medical issues as above           Health Care Directive  Patient does  have a Health Care Directive or Living Will: Patient states has Advance Directive and will bring in a copy to clinic.    HPI         7/28/2024   General Health   How would you rate your overall physical health? Good   Feel stress (tense, anxious, or unable to sleep) Rather much      (!) STRESS CONCERN      7/28/2024   Nutrition   Three or more servings of calcium each day? Yes   Diet: Regular (no restrictions)   How many servings of fruit and vegetables per day? (!) 2-3   How many sweetened beverages each day? 0-1            7/28/2024   Exercise   Days per week of moderate/strenous exercise 0 days   Average minutes spent exercising at this level 0 min      (!) EXERCISE CONCERN      7/28/2024   Social Factors   Frequency of gathering with friends or relatives Three times a week   Worry food won't last until get money to buy more No   Food not last or not have enough money for food? No   Do you have housing? (Housing is defined as stable permanent housing and does not include staying ouside in a car, in  a tent, in an abandoned building, in an overnight shelter, or couch-surfing.) Yes   Are you worried about losing your housing? No   Lack of transportation? No   Unable to get utilities (heat,electricity)? No            7/28/2024   Dental   Dentist two times every year? Yes            7/28/2024   TB Screening   Were you born outside of the [place]? No            Today's PHQ-2 Score:       8/1/2024     6:24 PM   PHQ-2 ( 1999 [employer])   Q1: Little interest or pleasure in doing things 1   Q2: Feeling down, depressed or hopeless 0   PHQ-2 Score 1   Q1: Little interest or pleasure in doing things Several days   Q2: Feeling down, depressed or hopeless Not at all   PHQ-2 Score 1           7/28/2024   Substance Use   Alcohol more than 3/day or more than 7/wk No   Do you use any other substances recreationally? No        Social History     Tobacco Use    Smoking status: Never    Smokeless tobacco: Never   Substance Use Topics    Alcohol use: Yes     Alcohol/week: 5.0 standard drinks of alcohol     Types: 5 Cans of beer per week     Comment: casual    Drug use: No            7/28/2024   STI Screening   New sexual partner(s) since last STI/HIV test? No            7/28/2024   Contraception/Family Planning   Questions about contraception or family planning No        Reviewed and updated as needed this visit by Provider    Sexual health reviewed and updated as needed this visit by Provider      Review of Systems  CONSTITUTIONAL: NEGATIVE for fever, chills, change in weight  INTEGUMENTARY/SKIN: NEGATIVE for worrisome rashes, moles or lesions  EYES: NEGATIVE for vision changes or irritation  ENT/MOUTH: NEGATIVE for ear, mouth and throat problems  RESP: NEGATIVE for significant cough or SOB  BREAST: NEGATIVE for masses, tenderness or discharge  CV: NEGATIVE for chest pain, palpitations or peripheral edema  GI: NEGATIVE for nausea, abdominal pain, heartburn, or change in bowel habits  : NEGATIVE for frequency, dysuria, or hematuria.   History chronic mild left scrotal pain after ejaculation.  Has see general surgery for small left side inguinal hernia which they felt did not require surgical management.  Small epididymal cyst and varicocele.  See recent urology note and discussion.  Currently asymptomatic  MUSCULOSKELETAL: NEGATIVE for significant arthralgias or myalgia except for left hip pain.  Working with [facility].  Patient states x-ray showed flattening of the femoral head.  No note from [facility] currently available to review in the chart.  Has left hip MRI scheduled through them for further evaluation  NEURO: NEGATIVE for weakness, dizziness or paresthesias  ENDOCRINE: NEGATIVE for temperature intolerance, skin/hair changes  HEME: NEGATIVE for bleeding problems  PSYCHIATRIC:  POSITIVE for anxiety. GAD7 = 10.  Tends to worry about things in general.  States he will frequently take worry too extremes.  For example, when his young children have had a fever, patient will take their temperature 7-8 times during the day and has jumped to worrying that they could have leukemia.  Anxiety affecting quality of life.  Denies depression.  PHQ-9 = 1     Objective    Exam  /71   Pulse 55   Temp 97.1  F (36.2  C) (Temporal)   Ht 1.829 m (6')   Wt 81.3 kg (179 lb 4.8 oz)   SpO2 100%   BMI 24.32 kg/m     Estimated body mass index is 24.32 kg/m  as calculated from the following:    Height as of this encounter: 1.829 m (6').    Weight as of this encounter: 81.3 kg (179 lb 4.8 oz).    Physical Exam  General appearance - healthy, alert, no distress. Mild anxious affect  Skin - No rashes or lesions.  Head - normocephalic, atraumatic  Eyes - MALISSA, EOMI, fundi exam with nondilated pupils negative.  Ears - External ears normal. Canals clear. TM's normal.  Nose/Sinuses - Nares normal. Septum midline. Mucosa normal. No drainage or sinus tenderness.  Oropharynx - No erythema, no adenopathy, no exudates.  Neck - Supple without adenopathy or thyromegaly. No  bruits.  Lungs - Clear to auscultation without wheezes/rhonchi.  Heart - Regular rate and rhythm without murmurs, clicks, or gallops.  Nodes - No supraclavicular, axillary, or inguinal adenopathy palpable.  Abdomen - Abdomen soft, non-tender. BS normal. No masses or hepatosplenomegaly palpable. No bruits.  Extremities -No cyanosis, clubbing or edema.    Musculoskeletal - Spine ROM normal. Muscular strength intact.  Mild tenderness to ROM left hip  Peripheral pulses - radial=4/4, femoral=4/4, posterior tibial=4/4, dorsalis pedis=4/4,  Neuro - Gait normal. Reflexes normal and symmetric. Sensation grossly WNL.  Genital - Normal-appearing male external genitalia. No scrotal masses. Minimal reducible  left inguinal palpable. Small left varicocele palpable  Rectal -patient provide deferred          Signed Electronically by: Berry Hancock MD

## 2024-08-02 NOTE — PATIENT INSTRUCTIONS
Escitalopram 10mg tab. Start by taking 1/2 tab daily for 1 week. Then if tolerating the medication OK but symptoms are not improving, increase the dose to 1 tab daily.  Take in the AM with food.  Possible initial side effects include jitteriness, nausea, insomnia. These are generally mild if present and resolve within a couple weeks. If severe side effects with the medication, then stop the medication and contact the clinic. Also contact the clinic immediately if you develop any suicidal ideation   Consider counseling/mental health therapy  Labs today as ordered  Follow up in 1 month.. Earlier as needed   OK for it to be a virtual visit. A video visit is preferred over a telephone visit if you have a smart phone or computer with a camera.  I would recommend you receive an influenza (flu) vaccine  this Fall (mid/late October) at the clinic or any pharmacy  Send us a copy of your healthcare directive to us via [employer] or drop off at the clinic or fax ([phone] attention Dr Hancock)  Follow-up with [facility] regarding left hip pain and future left hip MRI  Let me or Dr Rodgers (Urology) know if you wish to start pelvic floor therapy for scrotal/weight lossgroin pain with ejaculation  Pt was informed regarding extra E&M billing for management of new or established medical issues not related to today's wellness/screening visit

## 2024-08-15 ENCOUNTER — TRANSFERRED RECORDS (OUTPATIENT)
Dept: HEALTH INFORMATION MANAGEMENT | Facility: CLINIC | Age: 39
End: 2024-08-15
Payer: COMMERCIAL

## 2024-08-16 ENCOUNTER — PATIENT OUTREACH (OUTPATIENT)
Dept: CARE COORDINATION | Facility: CLINIC | Age: 39
End: 2024-08-16
Payer: COMMERCIAL

## 2024-09-04 ENCOUNTER — TRANSFERRED RECORDS (OUTPATIENT)
Dept: HEALTH INFORMATION MANAGEMENT | Facility: CLINIC | Age: 39
End: 2024-09-04
Payer: COMMERCIAL

## 2024-09-26 NOTE — PROGRESS NOTES
is a 39 year old who is being evaluated via a billable video visit.    How would you like to obtain your AVS? [employer]  If the video visit is dropped, the invitation should be resent by: Text to cell phone: [phone]  Will anyone else be joining your video visit? No      ASSESSMENT:    1. Anxiety  Controlled.  Continue current medication.  Patient will update me via Carefx in 6 months and follow-up annually or earlier if symptoms worsening or if wishes to undergo trial of tapering off medication in the future  - escitalopram (LEXAPRO) 10 MG tablet; Take 1 tablet (10 mg) by mouth daily.  Dispense: 90 tablet; Refill: 3    2. Hip pain, left   CAM impingement and partial labral tear.  Continue management per [facility].  informed patient if surgery considered in the future, will need to have preop clearance within 30 days of surgeryO.     3. Hyperlipidemia LDL goal <100  Not requiring prescription treatment.  Will reduce saturated fats in diet and repeat lab 1 year  - Lipid panel reflex to direct LDL Fasting; Future      PLAN:  Continue current medications  Prescriptions refilled at your pharmacy.    Call  [phone] or use [employer] to schedule a future lab appointment  fasting in 1 year.   For fasting labs, please refrain from eating for 8 hours or more.   Drink 2 glasses of water before your lab appointment. It is fine to take your  oral medications on the morning of the lab test as usual  Schedule a follow up appointment with me in clinic a few days after these future labs are drawn to review results and other medical issues as necessary.  Send me a Carefx message update in 6 months regarding mental health status or earlier as needed  Reduce saturated fats (red meats, fried and processed foods) in your diet and increase the amount of color on your plate with  vegetables.       Video-Visit Details    Type of service:  Video Visit    Video Start Time: 9:44am    Video End Time:10:00am    Originating Location (pt.  Location): Home    Distant Location (provider location):  [facility]     Platform used for Video Visit: Anibal Salinas is a 39 year old, presenting for the following health issues:  Recheck Medication and Anxiety  Pt reports no current side effects    History of Present Illness       Reason for visit:  Follow up visit for anxiety meds i recently started    He eats 2-3 servings of fruits and vegetables daily.He consumes 0 sweetened beverage(s) daily.He exercises with enough effort to increase his heart rate 9 or less minutes per day.  He exercises with enough effort to increase his heart rate 3 or less days per week.   He is taking medications regularly.       Most recent lab results reviewed with pt.      HPI:  Following up regarding anxiety.  Has been on Lexapro since early August.  Anxiety doing much better. GAD7 = 2.  Also had some occasional mild premature ejaculation symptoms that are also better with Lexapro.  Denies side effects with medication.  Working with [facility].  Has left hip pain history and noted to have CAM impingement and labral tear left hip.  Surgical option versus observation was discussed with the patient by Ortho.  Currently, patient not ready to undergo surgical management at this time based on level of symptoms but considering in the future.  Recent screening labs show mild lipid elevation.  Otherwise normal.  Based on patient's 10-year CAD risk, medication not needed at this time.  Denies other new complaints     Additional ROS:   Constitutional, HEENT, Cardiovascular, Pulmonary, GI and , Neuro, MSK and Psych review of systems/symptoms are otherwise negative or unchanged from previous, except as noted above.           Objective :  No vitals obtained today    Physical Exam:  GENERAL: healthy, alert and no distress  EYES: Eyes grossly normal to inspection, conjunctivae and sclerae normal  RESP: no audible wheeze, cough, or visible cyanosis.  No  visible retractions or increased work of breathing.  Able to speak fully in complete sentences.  NEURO: Cranial nerves grossly intact, mentation intact and speech normal  PSYCH: mentation appears normal, affect normal/bright, judgement and insight intact, normal speech and appearance well-groomed       Berry Hancock MD  Internal Medicine Department  [facility]  Internal Medicine Department      (Chart documentation was completed, in part, with [employer] voice-recognition software. Even though reviewed, some grammatical, spelling, and word errors may remain.)

## 2024-09-27 ENCOUNTER — VIRTUAL VISIT (OUTPATIENT)
Dept: INTERNAL MEDICINE | Facility: CLINIC | Age: 39
End: 2024-09-27
Payer: COMMERCIAL

## 2024-09-27 DIAGNOSIS — F41.9 ANXIETY: ICD-10-CM

## 2024-09-27 DIAGNOSIS — M25.552 HIP PAIN, LEFT: ICD-10-CM

## 2024-09-27 DIAGNOSIS — E78.5 HYPERLIPIDEMIA LDL GOAL <100: ICD-10-CM

## 2024-09-27 PROCEDURE — 99213 OFFICE O/P EST LOW 20 MIN: CPT | Mod: 95 | Performed by: INTERNAL MEDICINE

## 2024-09-27 RX ORDER — ESCITALOPRAM OXALATE 10 MG/1
10 TABLET ORAL DAILY
Qty: 90 TABLET | Refills: 3 | Status: SHIPPED | OUTPATIENT
Start: 2024-09-27

## 2024-09-27 ASSESSMENT — ANXIETY QUESTIONNAIRES
6. BECOMING EASILY ANNOYED OR IRRITABLE: NOT AT ALL
3. WORRYING TOO MUCH ABOUT DIFFERENT THINGS: SEVERAL DAYS
GAD7 TOTAL SCORE: 2
2. NOT BEING ABLE TO STOP OR CONTROL WORRYING: NOT AT ALL
1. FEELING NERVOUS, ANXIOUS, OR ON EDGE: SEVERAL DAYS
IF YOU CHECKED OFF ANY PROBLEMS ON THIS QUESTIONNAIRE, HOW DIFFICULT HAVE THESE PROBLEMS MADE IT FOR YOU TO DO YOUR WORK, TAKE CARE OF THINGS AT HOME, OR GET ALONG WITH OTHER PEOPLE: NOT DIFFICULT AT ALL
5. BEING SO RESTLESS THAT IT IS HARD TO SIT STILL: NOT AT ALL
7. FEELING AFRAID AS IF SOMETHING AWFUL MIGHT HAPPEN: NOT AT ALL
GAD7 TOTAL SCORE: 2

## 2024-09-27 ASSESSMENT — PATIENT HEALTH QUESTIONNAIRE - PHQ9: 5. POOR APPETITE OR OVEREATING: NOT AT ALL

## 2024-09-27 NOTE — PATIENT INSTRUCTIONS
Continue current medications  Prescriptions refilled at your pharmacy.    Call  [phone] or use [employer] to schedule a future lab appointment  fasting in 1 year.   For fasting labs, please refrain from eating for 8 hours or more.   Drink 2 glasses of water before your lab appointment. It is fine to take your  oral medications on the morning of the lab test as usual  Schedule a follow up appointment with me in clinic a few days after these future labs are drawn to review results and other medical issues as necessary.  Send me a [employer] message update in 6 months regarding mental health status or earlier as needed  Reduce saturated fats (red meats, fried and processed foods) in your diet and increase the amount of color on your plate with  vegetables.

## 2025-07-30 ENCOUNTER — OFFICE VISIT (OUTPATIENT)
Dept: URGENT CARE | Facility: URGENT CARE | Age: 40
End: 2025-07-30
Payer: COMMERCIAL

## 2025-07-30 VITALS
WEIGHT: 192.3 LBS | DIASTOLIC BLOOD PRESSURE: 88 MMHG | SYSTOLIC BLOOD PRESSURE: 133 MMHG | OXYGEN SATURATION: 100 % | RESPIRATION RATE: 16 BRPM | HEART RATE: 56 BPM | TEMPERATURE: 97 F | BODY MASS INDEX: 26.08 KG/M2

## 2025-07-30 DIAGNOSIS — R42 VERTIGO: Primary | ICD-10-CM

## 2025-07-30 DIAGNOSIS — R00.1 SINUS BRADYCARDIA: ICD-10-CM

## 2025-07-30 PROCEDURE — 99203 OFFICE O/P NEW LOW 30 MIN: CPT | Performed by: NURSE PRACTITIONER

## 2025-07-30 PROCEDURE — 93000 ELECTROCARDIOGRAM COMPLETE: CPT | Performed by: NURSE PRACTITIONER

## 2025-07-30 PROCEDURE — 3075F SYST BP GE 130 - 139MM HG: CPT | Performed by: NURSE PRACTITIONER

## 2025-07-30 PROCEDURE — 3079F DIAST BP 80-89 MM HG: CPT | Performed by: NURSE PRACTITIONER

## 2025-07-30 NOTE — PROGRESS NOTES
Urgent Care Clinic Visit    Chief Complaint   Patient presents with    Urgent Care    Dizziness     Dizziness and lightheaded, balance issue, started this morning. Pt is going out of town tomorrow.                7/30/2025     2:41 PM   Additional Questions   Roomed by Lyubov Damon   Accompanied by alone

## 2025-07-30 NOTE — PROGRESS NOTES
Assessment & Plan     1. Vertigo (Primary)    - EKG 12-lead complete w/read - Clinics    2. Sinus bradycardia    Review of patient's history shows patient typically runs a heart rate around 60s to upper 50s.  EKG indicated heart rate of 48 sinus bradycardia patient was asymptomatic at time.  Patient is asymptomatic in clinic.  Are unable to recreate patient's dizziness on exam.  We discussed symptoms possibly related to BPPV we discussed Epley maneuver for this.  If symptoms of bradycardia occur with dizziness sustained, lightheadedness, fatigue would recommend close follow-up either through emergency department or patient's primary care provider for further evaluation possible Zio patch.    We discussed red flag symptoms that would warrant emergent evaluation patient verbalized understanding is agreement this plan.    [facility] LU Salinas is a 40 year old male who presents to clinic today for the following health issues:  Chief Complaint   Patient presents with    Urgent Care    Dizziness     Dizziness and lightheaded, balance issue, started this morning. Pt is going out of town tomorrow.          7/30/2025     2:41 PM   Additional Questions   Roomed by Lyubov Damon   Accompanied by alone     HPI    Patient presents to clinic with symptoms of dizziness for the past 2 days states that her first dizzy episode was last evening when patient bent forward lasted for approximately 2 hours and then subsided patient had a reoccurrence today of the dizziness that lasted for a couple of hours and then has slowly dissipated.  Patient denies sinus congestion, upper respiratory congestion cough, fever, malaise, shortness of breath, chest pain, GI symptoms.  Patient is a healthy 40-year-old male he does take Lexapro for anxiety but has been on this medication for some time.    Past history of mild hyperlipidemia.    Orthostatic blood pressures completed in clinic  were normal.      Review of Systems  Constitutional, HEENT, cardiovascular, pulmonary, gi and gu systems are negative, except as otherwise noted.      Objective    /80   Pulse 55   Temp 97  F (36.1  C) (Tympanic)   Resp 16   Wt 87.2 kg (192 lb 4.8 oz)   SpO2 100%   BMI 26.08 kg/m    Physical Exam   GENERAL: alert and no distress  EYES: Eyes grossly normal to inspection, PERRL and conjunctivae and sclerae normal  HENT: ear canals and TM's normal, nose and mouth without ulcers or lesions  NECK: no adenopathy, no asymmetry, masses, or scars  RESP: lungs clear to auscultation - no rales, rhonchi or wheezes  CV: regular rate and rhythm, normal S1 S2, no S3 or S4, no murmur, click or rub, no peripheral edema  ABDOMEN: soft, nontender, no hepatosplenomegaly, no masses and bowel sounds normal  MS: no gross musculoskeletal defects noted, no edema  NEURO: Normal strength and tone, sensory exam grossly normal, mentation intact, rapid alternating movements normal, and negative [place]    EKG - Reviewed and interpreted by me sinus bradycardia, normal axis, normal intervals, no acute ST/T changes c/w ischemia, no LVH by voltage criteria, unchanged from previous tracings

## 2025-08-28 ENCOUNTER — VIRTUAL VISIT (OUTPATIENT)
Dept: INTERNAL MEDICINE | Facility: CLINIC | Age: 40
End: 2025-08-28
Payer: COMMERCIAL

## 2025-08-28 DIAGNOSIS — N52.9 ERECTILE DYSFUNCTION, UNSPECIFIED ERECTILE DYSFUNCTION TYPE: Primary | ICD-10-CM

## 2025-08-28 RX ORDER — SILDENAFIL 50 MG/1
50 TABLET, FILM COATED ORAL DAILY PRN
Qty: 30 TABLET | Refills: 3 | Status: SHIPPED | OUTPATIENT
Start: 2025-08-28